# Patient Record
Sex: FEMALE | Race: WHITE | NOT HISPANIC OR LATINO | Employment: FULL TIME | URBAN - METROPOLITAN AREA
[De-identification: names, ages, dates, MRNs, and addresses within clinical notes are randomized per-mention and may not be internally consistent; named-entity substitution may affect disease eponyms.]

---

## 2017-08-14 ENCOUNTER — PROCEDURE VISIT (OUTPATIENT)
Dept: INTERNAL MEDICINE | Facility: CLINIC | Age: 27
End: 2017-08-14

## 2017-08-14 VITALS
SYSTOLIC BLOOD PRESSURE: 130 MMHG | HEART RATE: 91 BPM | BODY MASS INDEX: 47.72 KG/M2 | DIASTOLIC BLOOD PRESSURE: 72 MMHG | OXYGEN SATURATION: 100 % | WEIGHT: 278 LBS

## 2017-08-14 DIAGNOSIS — R87.619 ABNORMAL CERVICAL PAPANICOLAOU SMEAR, UNSPECIFIED ABNORMAL PAP FINDING: ICD-10-CM

## 2017-08-14 DIAGNOSIS — M25.562 LEFT KNEE PAIN, UNSPECIFIED CHRONICITY: Primary | ICD-10-CM

## 2017-08-14 PROCEDURE — 99213 OFFICE O/P EST LOW 20 MIN: CPT | Performed by: INTERNAL MEDICINE

## 2017-08-14 RX ORDER — IBUPROFEN 600 MG/1
600 TABLET ORAL EVERY 6 HOURS PRN
Qty: 30 TABLET | Refills: 2 | Status: SHIPPED | OUTPATIENT
Start: 2017-08-14 | End: 2018-09-28

## 2017-08-14 NOTE — PROGRESS NOTES
Gynecologic Exam and Knee Pain  and c/o left knee pain    Subjective   Zi Laboy is a 26 y.o. female is here today for follow-up.    History of Present Illness   wAS PLAYING SLIP AND SLIDE, and fell, and hurt her left knee.July 1st ( appx 6 weeks ago).  Denies swelling, having some persistent pain.  Here for a annual pap, s/p colposcopy and bx last year after her abnormal pap.        Current Outpatient Prescriptions:   •  ibuprofen (ADVIL,MOTRIN) 600 MG tablet, Take 1 tablet by mouth Every 6 (Six) Hours As Needed for Mild Pain (1-3)., Disp: 30 tablet, Rfl: 2      The following portions of the patient's history were reviewed and updated as appropriate: allergies, current medications, past family history, past medical history, past social history, past surgical history and problem list.    Review of Systems   Constitutional: Negative.  Negative for chills and fever.   HENT: Negative for ear discharge, ear pain, sinus pressure and sore throat.    Respiratory: Negative for cough, chest tightness and shortness of breath.    Cardiovascular: Negative for chest pain, palpitations and leg swelling.   Gastrointestinal: Negative for diarrhea, nausea and vomiting.   Musculoskeletal: Positive for arthralgias (knee pain) and joint swelling. Negative for back pain and myalgias.   Neurological: Negative for dizziness, syncope and headaches.   Psychiatric/Behavioral: Negative for confusion and sleep disturbance.       Objective   /72  Pulse 91  Wt 278 lb (126 kg)  SpO2 100%  BMI 47.72 kg/m2       Physical Exam   Constitutional: She is oriented to person, place, and time. She appears well-developed and well-nourished.   HENT:   Head: Normocephalic and atraumatic.   Right Ear: External ear normal.   Left Ear: External ear normal.   Mouth/Throat: No oropharyngeal exudate.   Eyes: Conjunctivae are normal. Pupils are equal, round, and reactive to light.   Neck: Neck supple.   Cardiovascular: Normal rate and regular rhythm.     Pulmonary/Chest: Effort normal and breath sounds normal.   Abdominal: Soft. Bowel sounds are normal.   Genitourinary: Uterus normal. Vaginal discharge: minimal normal appearing discharge.   Genitourinary Comments: Pap sent   Musculoskeletal: She exhibits no edema or tenderness (mildly decreased rom left knee).   Neurological: She is alert and oriented to person, place, and time. No cranial nerve deficit.   Skin: Skin is warm and dry.   Psychiatric: She has a normal mood and affect. Judgment normal.   Nursing note and vitals reviewed.        Results for orders placed or performed during the hospital encounter of 09/10/16   STAT Influenza antigen   Result Value Ref Range    Influenza A Ag, EIA Negative Negative    Influenza B Ag, EIA Negative Negative   Once Comprehensive metabolic panel   Result Value Ref Range    Glucose 91 70 - 100 mg/dL    BUN 9 9 - 23 mg/dL    Creatinine 0.70 0.60 - 1.30 mg/dL    Sodium 137 132 - 146 mmol/L    Potassium 4.0 3.5 - 5.5 mmol/L    Chloride 104 99 - 109 mmol/L    CO2 26.0 20.0 - 31.0 mmol/L    Calcium 9.2 8.7 - 10.4 mg/dL    Total Protein 7.6 5.7 - 8.2 g/dL    Albumin 4.20 3.20 - 4.80 g/dL    ALT (SGPT) 25 7 - 40 U/L    AST (SGOT) 27 0 - 33 U/L    Alkaline Phosphatase 69 25 - 100 U/L    Total Bilirubin 0.5 0.3 - 1.2 mg/dL    eGFR Non African Amer 101 >60 mL/min/1.73    Globulin 3.4 gm/dL    A/G Ratio 1.2 g/dL    BUN/Creatinine Ratio 12.9 7.0 - 25.0    Anion Gap 7.0 3.0 - 11.0 mmol/L   Once Lactic acid, plasma   Result Value Ref Range    Lactate 1.1 0.5 - 2.0 mmol/L   Once CBC auto differential   Result Value Ref Range    WBC 8.15 3.50 - 10.80 10*3/mm3    RBC 4.14 3.89 - 5.14 10*6/mm3    Hemoglobin 12.8 11.5 - 15.5 g/dL    Hematocrit 38.9 34.5 - 44.0 %    MCV 94.0 80.0 - 99.0 fL    MCH 30.9 27.0 - 31.0 pg    MCHC 32.9 32.0 - 36.0 g/dL    RDW 14.8 (H) 11.3 - 14.5 %    RDW-SD 50.8 37.0 - 54.0 fl    MPV 10.6 6.0 - 12.0 fL    Platelets 220 150 - 450 10*3/mm3    Neutrophil % 79.1 (H) 41.0  - 71.0 %    Lymphocyte % 15.5 (L) 24.0 - 44.0 %    Monocyte % 3.3 0.0 - 12.0 %    Eosinophil % 1.8 0.0 - 3.0 %    Basophil % 0.1 0.0 - 1.0 %    Immature Grans % 0.2 0.0 - 0.6 %    Neutrophils, Absolute 6.44 1.50 - 8.30 10*3/mm3    Lymphocytes, Absolute 1.26 0.60 - 4.80 10*3/mm3    Monocytes, Absolute 0.27 0.00 - 1.00 10*3/mm3    Eosinophils, Absolute 0.15 0.10 - 0.30 10*3/mm3    Basophils, Absolute 0.01 0.00 - 0.20 10*3/mm3    Immature Grans, Absolute 0.02 0.00 - 0.03 10*3/mm3   Once Light Blue Top   Result Value Ref Range    Extra Tube hold for add-on    Once Green Top (Gel)   Result Value Ref Range    Extra Tube Hold for add-ons.    Once Lavender Top   Result Value Ref Range    Extra Tube hold for add-on    Once Gold Top - SST   Result Value Ref Range    Extra Tube Hold for add-ons.              Assessment/Plan   Diagnoses and all orders for this visit:    Left knee pain, unspecified chronicity  -     ibuprofen (ADVIL,MOTRIN) 600 MG tablet; Take 1 tablet by mouth Every 6 (Six) Hours As Needed for Mild Pain (1-3).    Abnormal cervical Papanicolaou smear, unspecified abnormal pap finding      Pap smear sent await results.     Hand out for knee exercises given, adv. To do the stretches regularly for 4-6 weeks and cinb.  Continue current regimen      Return in about 1 year (around 8/14/2018) for Annual physical.

## 2017-09-13 ENCOUNTER — OFFICE VISIT (OUTPATIENT)
Dept: OBSTETRICS AND GYNECOLOGY | Facility: CLINIC | Age: 27
End: 2017-09-13

## 2017-09-13 VITALS
HEIGHT: 64 IN | WEIGHT: 275 LBS | SYSTOLIC BLOOD PRESSURE: 128 MMHG | DIASTOLIC BLOOD PRESSURE: 80 MMHG | BODY MASS INDEX: 46.95 KG/M2

## 2017-09-13 DIAGNOSIS — R87.610 ATYPICAL SQUAMOUS CELLS OF UNDETERMINED SIGNIFICANCE ON CYTOLOGIC SMEAR OF CERVIX (ASC-US): Primary | ICD-10-CM

## 2017-09-13 PROCEDURE — 57455 BIOPSY OF CERVIX W/SCOPE: CPT | Performed by: OBSTETRICS & GYNECOLOGY

## 2017-09-13 RX ORDER — ALBUTEROL SULFATE 90 UG/1
2 AEROSOL, METERED RESPIRATORY (INHALATION) EVERY 4 HOURS PRN
COMMUNITY
End: 2019-08-11

## 2017-09-13 NOTE — PROGRESS NOTES
"Date of precedure: 9/13/2017  Patient has a history of ASCUS on a Pap test in September 2015 with colposcopy which confirmed those findings.  She was instructed to return in 6 months however she did not do that.  She had a Pap test in Dr. Melgar's office on 8/14/2017 read as ASCUS, positive for HPV non-16/18.  She is here today for colposcopy.  She has had colposcopy before and is aware of the risks of pain, bleeding, and infection.   /80  Ht 64\" (162.6 cm)  Wt 275 lb (125 kg)  LMP 08/15/2017 (Exact Date)  BMI 47.2 kg/m2    No OB History data found    Risks and benefits discussed? yes  All questions answered? yes  Consents given by the patient  Written consent obtained? yes    Local anesthesia used:  no    Pre-op indication: ASCUS with POSITIVE high risk HPV  Procedure documentation:    The cervix was initially viewed colposcopically through a green filter.  The cervix was next bathed in acetic acid.   The findings were as follows:      The transformation zone was able to be seen adequately.    Findings  Acetowhite noted at 5 o'clock and 7 o'clock  Mosaicism noted at 1 o'clock, 2 o'clock, 3 o'clock, 4 o'clock and 12 o'clock Physical Exam: Lungs- Clear to auscultation, C-V- RRR with no murmur, rub or gallop, Abd.- obese, soft, non-tender with no organomegaly           Ectocervical biopsies taken from 2 o'clock, 5 o'clock and 7 o'clock.  Monsels solution was applied to the biopsy sites..  An ECC was not performed.      Colposcopic Impression: 1. Adequate colposcopy  2. Colposcopic findings are consistent with PAP (ASCUS with HPV, non-16/18 positive.       Plan: Will base further treatment on pathology results. Colposcopy with biopsies of cervix done.    *Please note that portions of this documentation may have been completed with a voice recognition program.  Efforts were made to edit this dictation, but occasional words may have been mistranscribed.     This note was electronically signed.    GERMÁN Pascual" MD [place]  September 13, 2017  9:39 AM

## 2017-09-18 ENCOUNTER — TELEPHONE (OUTPATIENT)
Dept: OBSTETRICS AND GYNECOLOGY | Facility: CLINIC | Age: 27
End: 2017-09-18

## 2017-09-18 RX ORDER — FLUCONAZOLE 150 MG/1
150 TABLET ORAL EVERY OTHER DAY
Qty: 3 TABLET | Refills: 0 | Status: SHIPPED | OUTPATIENT
Start: 2017-09-18 | End: 2018-03-14

## 2017-09-18 RX ORDER — DOXYCYCLINE HYCLATE 100 MG/1
100 TABLET, DELAYED RELEASE ORAL 2 TIMES DAILY
Qty: 14 TABLET | Refills: 0 | Status: SHIPPED | OUTPATIENT
Start: 2017-09-18 | End: 2017-09-25

## 2017-09-18 NOTE — TELEPHONE ENCOUNTER
Cervical biopsy report returns showing acute and chronic cervicitis and HPV.  Per Dr. Magallon, will treat with doxycycline 100 mg BID X 7 days.  Will also phone in diflucan 150 mg #3 to take qod while on doxycycline.  She has an appointment in 6 months for repeat pap.

## 2017-09-29 DIAGNOSIS — N91.2 AMENORRHEA: ICD-10-CM

## 2017-09-29 RX ORDER — NORGESTIMATE AND ETHINYL ESTRADIOL 7DAYSX3 28
KIT ORAL
Qty: 28 TABLET | Refills: 6 | Status: SHIPPED | OUTPATIENT
Start: 2017-09-29 | End: 2018-03-14

## 2018-03-14 ENCOUNTER — OFFICE VISIT (OUTPATIENT)
Dept: OBSTETRICS AND GYNECOLOGY | Facility: CLINIC | Age: 28
End: 2018-03-14

## 2018-03-14 VITALS
HEIGHT: 64 IN | DIASTOLIC BLOOD PRESSURE: 76 MMHG | SYSTOLIC BLOOD PRESSURE: 110 MMHG | WEIGHT: 270.4 LBS | BODY MASS INDEX: 46.16 KG/M2

## 2018-03-14 DIAGNOSIS — B37.31 VAGINAL CANDIDOSIS: ICD-10-CM

## 2018-03-14 DIAGNOSIS — R87.610 ATYPICAL SQUAMOUS CELLS OF UNDETERMINED SIGNIFICANCE ON CYTOLOGIC SMEAR OF CERVIX (ASC-US): Primary | ICD-10-CM

## 2018-03-14 PROCEDURE — 99213 OFFICE O/P EST LOW 20 MIN: CPT | Performed by: OBSTETRICS & GYNECOLOGY

## 2018-03-14 RX ORDER — FLUCONAZOLE 150 MG/1
150 TABLET ORAL EVERY OTHER DAY
Qty: 3 TABLET | Refills: 0 | Status: SHIPPED | OUTPATIENT
Start: 2018-03-14 | End: 2018-03-19

## 2018-03-14 NOTE — PROGRESS NOTES
Chief Complaint   Patient presents with   • Follow-up     repeat pap       Zi Laboy is a 27 y.o. year old  presenting to be seen for a PAP in follow-up of a prior abnormal PAP and/or HPV screen. This patient was referred by Nancy Melgar M.D., in  after having a Pap test on 2017 read as ASCUS, HPV, non--16/18 positive.  She underwent colposcopy in our office on 2017 with cervical biopsies read as atypia with HPV effect.  She returns today for a follow-up Pap test.  She has cyclic menses without intermenstrual bleeding.  She denies bowel or urinary symptoms.    Her last PAP: was done on approximately 2017 and the result was: ASCUS with POSITIVE high risk HPV (non-16/18).  Her last HPV test: was done on approximately 2017 and the result was:  HPV non/16/18 (+)  Uses tobacco currently: no  Sexually active: yes  Current contraception: none    No Additional Complaints Reported    The following portions of the patient's history were reviewed and updated as appropriate:vital signs and   She  does not have any pertinent problems on file.  She  has a past surgical history that includes Tonsillectomy (2008).  Her family history includes Diabetes in her other; Heart attack in her other; Hypertension in her mother and other.  She  reports that she has been smoking Cigarettes.  She has a 2.50 pack-year smoking history. She has never used smokeless tobacco. She reports that she drinks alcohol. She reports that she does not use drugs.  Current Outpatient Prescriptions   Medication Sig Dispense Refill   • albuterol (PROVENTIL HFA;VENTOLIN HFA) 108 (90 Base) MCG/ACT inhaler Inhale 2 puffs Every 4 (Four) Hours As Needed for Wheezing.     • ibuprofen (ADVIL,MOTRIN) 600 MG tablet Take 1 tablet by mouth Every 6 (Six) Hours As Needed for Mild Pain (1-3). 30 tablet 2   • fluconazole (DIFLUCAN) 150 MG tablet Take 1 tablet by mouth Every Other Day for 3 doses. 1 Every other day 3 tablet 0     No  "current facility-administered medications for this visit.      She has No Known Allergies..    Review of Systems  A comprehensive review of systems was negative.  Constitutional: negative for fever, chills, activity change, appetite change, fatigue and unexpected weight change.  Respiratory: negative  Cardiovascular: negative  Gastrointestinal: negative  Genitourinary:negative  Musculoskeletal:negative  Behavioral/Psych: negative        /76   Ht 162.6 cm (64\")   Wt 123 kg (270 lb 6.4 oz)   LMP 02/28/2018 (Exact Date)   BMI 46.41 kg/m²     Physical Exam    General:  alert; cooperative; well developed; well nourished  obese - Body mass index is 46.41 kg/m².   Thyroid: normal to inspection and palpation   Abdomen: soft, non-tender; no masses  no umbilical or inginual hernias are present  no hepato-splenomegaly   Pelvis: Clinical staff was present for exam  External genitalia:  normal appearance of the external genitalia including Bartholin's and [place]'s glands.  Vaginal:  normal pink mucosa without prolapse or lesions.  Cervix:  normal appearance.  Uterus:  normal size, shape and consistency. anteverted;  Adnexa:  normal bimanual exam of the adnexa.  Rectal:  anus visually normal appearing. recto-vaginal exam unremarkable and confirms findings;     Lab Review   No data reviewed beyond the Pap test    Imaging   No data reviewed       ASSESSMENT  Problems Addressed this Visit        Other    Abnormal Pap smear of cervix - Primary    Relevant Orders    Liquid-based Pap Smear, Screening      Other Visit Diagnoses     Vaginal candidosis        Relevant Medications    fluconazole (DIFLUCAN) 150 MG tablet              PLAN:   Pap test done  Fluconazole, 150 mg QOD for 3 doses  Follow up: 6 month(s)   *Please note that portions of this documentation may have been completed with a voice recognition program.  Efforts were made to edit this dictation, but occasional words may have been mistranscribed.  This note was " electronically signed.    GERMÁN Magallon MD  March 14, 2018  10:25 AM

## 2018-09-05 ENCOUNTER — OFFICE VISIT (OUTPATIENT)
Dept: OBSTETRICS AND GYNECOLOGY | Facility: CLINIC | Age: 28
End: 2018-09-05

## 2018-09-05 VITALS
SYSTOLIC BLOOD PRESSURE: 110 MMHG | DIASTOLIC BLOOD PRESSURE: 66 MMHG | WEIGHT: 272.6 LBS | BODY MASS INDEX: 46.54 KG/M2 | HEIGHT: 64 IN

## 2018-09-05 DIAGNOSIS — R87.610 ASCUS WITH POSITIVE HIGH RISK HPV CERVICAL: Primary | ICD-10-CM

## 2018-09-05 DIAGNOSIS — Z01.419 ENCOUNTER FOR GYNECOLOGICAL EXAMINATION WITHOUT ABNORMAL FINDING: ICD-10-CM

## 2018-09-05 DIAGNOSIS — R87.810 ASCUS WITH POSITIVE HIGH RISK HPV CERVICAL: Primary | ICD-10-CM

## 2018-09-05 PROBLEM — R87.619 ABNORMAL PAP SMEAR OF CERVIX: Status: RESOLVED | Noted: 2017-08-14 | Resolved: 2018-09-05

## 2018-09-05 PROCEDURE — 99395 PREV VISIT EST AGE 18-39: CPT | Performed by: OBSTETRICS & GYNECOLOGY

## 2018-09-05 NOTE — PROGRESS NOTES
Chief Complaint   Patient presents with   • Gynecologic Exam       Zi Laboy is a 28 y.o. year old  presenting to be seen for her annual exam.  This patient has a history of ASCUS, HPV-non-16/18 positivity on her Pap test from 3/14/2018.  Colposcopy with directed biopsies in March revealed chronic cervicitis with HPV effect.  She returns today for follow-up Pap test.  She denies symptoms of vaginal infection.  She denies bowel or urinary symptoms.  She has cyclic menses without intermenstrual bleeding.  She is not sexually active at present.    SCREENING TESTS    Year 2012   Age                         PAP       ASCUS                  HPV high risk       Non-16/18                  Mammogram                         MYRANDA score                         Breast MRI                         Lipids                         Vitamin D                         Colonoscopy                         DEXA  Frax (hip/any)                         Ovarian Screen                             She exercises regularly: no.  She wears her seat belt: yes.  She has concerns about domestic violence: no.  She has noticed changes in height: no    GYN screening history:  · Last pap: was done on approximately 3/14/2018 and the result was: ASCUS with POSITIVE high risk HPV (Non-16/18)..    No Additional Complaints Reported    The following portions of the patient's history were reviewed and updated as appropriate:vital signs and   She  has a past medical history of Abnormal Pap smear of cervix; Gastritis; and Vaginitis.  She  does not have any pertinent problems on file.  She  has a past surgical history that includes Tonsillectomy (2008).  Her family history includes Diabetes in her other; Heart attack in her other; Hypertension in her mother and other.  She  reports that she has been smoking Cigarettes.  She has a 2.50 pack-year  "smoking history. She has never used smokeless tobacco. She reports that she drinks alcohol. She reports that she does not use drugs.  Current Outpatient Prescriptions   Medication Sig Dispense Refill   • albuterol (PROVENTIL HFA;VENTOLIN HFA) 108 (90 Base) MCG/ACT inhaler Inhale 2 puffs Every 4 (Four) Hours As Needed for Wheezing.     • ibuprofen (ADVIL,MOTRIN) 600 MG tablet Take 1 tablet by mouth Every 6 (Six) Hours As Needed for Mild Pain (1-3). 30 tablet 2     No current facility-administered medications for this visit.      She has No Known Allergies..    Review of Systems  A comprehensive review of systems was taken.  Constitutional: negative for fever, chills, activity change, appetite change, fatigue and unexpected weight change.  Respiratory: negative  Cardiovascular: negative  Gastrointestinal: negative  Genitourinary:negative  Musculoskeletal:negative  Behavioral/Psych: negative       /66   Ht 162.6 cm (64\")   Wt 124 kg (272 lb 9.6 oz)   LMP 08/10/2018 (Exact Date)   BMI 46.79 kg/m²     Physical Exam    General:  alert; cooperative; well developed; well nourished  obese - Body mass index is 46.79 kg/m².   Skin:  No suspicious lesions seen   Thyroid: normal to inspection and palpation   Lungs:  clear to auscultation bilaterally   Heart:  regular rate and rhythm, S1, S2 normal, no murmur, click, rub or gallop   Breasts:  Examined in supine position  Symmetric without masses or skin dimpling  Nipples normal without inversion, lesions or discharge  There are no palpable axillary nodes   Abdomen: soft, non-tender; no masses  no umbilical or inginual hernias are present  no hepato-splenomegaly  obese   Pelvis: Clinical staff was present for exam  External genitalia:  normal appearance of the external genitalia including Bartholin's and [place]'s glands.  Vaginal:  normal pink mucosa without prolapse or lesions.  Cervix:  normal appearance.  Uterus:  normal size, shape and consistency. " anteverted;  Adnexa:  normal bimanual exam of the adnexa.     Lab Review   No data reviewed    Imaging  No data reviewed         ASSESSMENT  Problems Addressed this Visit        Other    ASCUS with positive high risk HPV cervical - Primary    Relevant Orders    Liquid-based Pap Smear, Screening      Other Visit Diagnoses     Encounter for gynecological examination without abnormal finding              PLAN    · Medications prescribed this encounter:  No orders of the defined types were placed in this encounter.  · Pap test done  · I have strongly encouraged the patient to stop smoking  · Low carbohydrate diet and increased exercise  · Follow up: 12 month(s)  *Please note that portions of this documentation may have been completed with a voice recognition program.  Efforts were made to edit this dictation, but occasional words may have been mistranscribed.       This note was electronically signed.    GERMÁN Magallon MD  September 5, 2018  10:08 AM

## 2018-09-28 ENCOUNTER — HOSPITAL ENCOUNTER (EMERGENCY)
Facility: HOSPITAL | Age: 28
Discharge: HOME | End: 2018-09-28
Attending: EMERGENCY MEDICINE | Admitting: EMERGENCY MEDICINE

## 2018-09-28 ENCOUNTER — APPOINTMENT (OUTPATIENT)
Dept: GENERAL RADIOLOGY | Facility: HOSPITAL | Age: 28
End: 2018-09-28

## 2018-09-28 VITALS
BODY MASS INDEX: 47.84 KG/M2 | TEMPERATURE: 98.3 F | WEIGHT: 270 LBS | HEART RATE: 80 BPM | HEIGHT: 63 IN | OXYGEN SATURATION: 98 % | SYSTOLIC BLOOD PRESSURE: 128 MMHG | DIASTOLIC BLOOD PRESSURE: 75 MMHG | RESPIRATION RATE: 18 BRPM

## 2018-09-28 DIAGNOSIS — S30.0XXA LUMBAR CONTUSION, INITIAL ENCOUNTER: ICD-10-CM

## 2018-09-28 DIAGNOSIS — S20.20XA CONTUSION OF THORACIC WALL, INITIAL ENCOUNTER: ICD-10-CM
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
## 2018-09-28 PROCEDURE — 72072 X-RAY EXAM THORAC SPINE 3VWS: CPT

## 2018-09-28 PROCEDURE — 72100 X-RAY EXAM L-S SPINE 2/3 VWS: CPT

## 2018-09-28 PROCEDURE — 99282 EMERGENCY DEPT VISIT SF MDM: CPT

## 2018-09-28 RX ORDER — NAPROXEN 500 MG/1
500 TABLET ORAL 2 TIMES DAILY WITH MEALS
Qty: 10 TABLET | Refills: 0 | Status: SHIPPED | OUTPATIENT
Start: 2018-09-28 | End: 2018-10-03

## 2018-10-03 ENCOUNTER — OFFICE VISIT (OUTPATIENT)
Dept: INTERNAL MEDICINE | Facility: CLINIC | Age: 28
End: 2018-10-03

## 2018-10-03 VITALS
RESPIRATION RATE: 20 BRPM | BODY MASS INDEX: 49.11 KG/M2 | DIASTOLIC BLOOD PRESSURE: 66 MMHG | SYSTOLIC BLOOD PRESSURE: 118 MMHG | HEART RATE: 89 BPM | WEIGHT: 277.25 LBS | TEMPERATURE: 98.3 F | OXYGEN SATURATION: 98 %
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
## 2018-10-03 DIAGNOSIS — M54.50 ACUTE MIDLINE LOW BACK PAIN WITHOUT SCIATICA: ICD-10-CM

## 2018-10-03 PROCEDURE — 99213 OFFICE O/P EST LOW 20 MIN: CPT | Performed by: INTERNAL MEDICINE

## 2018-10-03 RX ORDER — CYCLOBENZAPRINE HCL 10 MG
10 TABLET ORAL 3 TIMES DAILY PRN
Qty: 30 TABLET | Refills: 2 | OUTPATIENT
Start: 2018-10-03 | End: 2019-08-11

## 2018-10-03 RX ORDER — NAPROXEN 500 MG/1
500 TABLET ORAL 2 TIMES DAILY WITH MEALS
Qty: 30 TABLET | Refills: 2 | OUTPATIENT
Start: 2018-10-03 | End: 2019-08-11
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
## 2018-10-12 ENCOUNTER — TELEPHONE (OUTPATIENT)
Dept: INTERNAL MEDICINE | Facility: CLINIC | Age: 28
End: 2018-10-12

## 2018-10-12 NOTE — TELEPHONE ENCOUNTER
Patient called and stated that she is following up and stated since Chiropractor her pain has been decreasing.

## 2018-10-23 ENCOUNTER — OFFICE VISIT (OUTPATIENT)
Dept: INTERNAL MEDICINE | Facility: CLINIC | Age: 28
End: 2018-10-23

## 2018-10-23 VITALS
WEIGHT: 277 LBS | SYSTOLIC BLOOD PRESSURE: 102 MMHG | BODY MASS INDEX: 49.07 KG/M2 | HEART RATE: 95 BPM | DIASTOLIC BLOOD PRESSURE: 70 MMHG | OXYGEN SATURATION: 99 %

## 2018-10-23 DIAGNOSIS — M54.9 UPPER BACK PAIN: Primary | ICD-10-CM

## 2018-10-23 PROCEDURE — 99212 OFFICE O/P EST SF 10 MIN: CPT | Performed by: INTERNAL MEDICINE
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
## 2019-08-11 ENCOUNTER — APPOINTMENT (OUTPATIENT)
Dept: GENERAL RADIOLOGY | Facility: HOSPITAL | Age: 29
End: 2019-08-11

## 2019-08-11 ENCOUNTER — HOSPITAL ENCOUNTER (EMERGENCY)
Facility: HOSPITAL | Age: 29
Discharge: HOME | End: 2019-08-11
Attending: EMERGENCY MEDICINE | Admitting: EMERGENCY MEDICINE

## 2019-08-11 VITALS
SYSTOLIC BLOOD PRESSURE: 120 MMHG | BODY MASS INDEX: 47.84 KG/M2 | TEMPERATURE: 99.2 F | DIASTOLIC BLOOD PRESSURE: 72 MMHG | WEIGHT: 270 LBS | RESPIRATION RATE: 18 BRPM | HEIGHT: 63 IN | HEART RATE: 106 BPM | OXYGEN SATURATION: 98 %
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
## 2019-08-11 DIAGNOSIS — S39.012A LUMBOSACRAL STRAIN, INITIAL ENCOUNTER: ICD-10-CM

## 2019-08-11 DIAGNOSIS — E66.01 MORBID OBESITY WITH BMI OF 45.0-49.9, ADULT (HCC): ICD-10-CM

## 2019-08-11 LAB
B-HCG UR QL: NEGATIVE
INTERNAL NEGATIVE CONTROL: NEGATIVE
INTERNAL POSITIVE CONTROL: POSITIVE
Lab: NORMAL

## 2019-08-11 PROCEDURE — 72100 X-RAY EXAM L-S SPINE 2/3 VWS: CPT

## 2019-08-11 PROCEDURE — 99283 EMERGENCY DEPT VISIT LOW MDM: CPT

## 2019-08-11 PROCEDURE — 81025 URINE PREGNANCY TEST: CPT | Performed by: EMERGENCY MEDICINE

## 2019-08-11 RX ORDER — ACETAMINOPHEN 500 MG
1000 TABLET ORAL ONCE
Status: COMPLETED | OUTPATIENT
Start: 2019-08-11 | End: 2019-08-11

## 2019-08-11 RX ORDER — METAXALONE 800 MG/1
800 TABLET ORAL ONCE
Status: COMPLETED | OUTPATIENT
Start: 2019-08-11 | End: 2019-08-11

## 2019-08-11 RX ORDER — NAPROXEN 250 MG/1
500 TABLET ORAL ONCE
Status: COMPLETED | OUTPATIENT
Start: 2019-08-11 | End: 2019-08-11

## 2019-08-11 RX ORDER — NAPROXEN 500 MG/1
500 TABLET ORAL 2 TIMES DAILY WITH MEALS
Qty: 14 TABLET | Refills: 0 | Status: SHIPPED | OUTPATIENT
Start: 2019-08-11

## 2019-08-11 RX ORDER — METAXALONE 800 MG/1
800 TABLET ORAL 3 TIMES DAILY PRN
Qty: 15 TABLET | Refills: 0 | Status: SHIPPED | OUTPATIENT
Start: 2019-08-11

## 2019-08-11 RX ADMIN — NAPROXEN 500 MG: 250 TABLET ORAL at 14:37

## 2019-08-11 RX ADMIN — METAXALONE 800 MG: 800 TABLET ORAL at 14:37

## 2019-08-11 RX ADMIN — ACETAMINOPHEN 1000 MG: 500 TABLET, FILM COATED ORAL at 14:37
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
## 2019-08-14 ENCOUNTER — TELEPHONE (OUTPATIENT)
Dept: INTERNAL MEDICINE | Facility: CLINIC | Age: 29
End: 2019-08-14
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
## 2019-08-16 ENCOUNTER — OFFICE VISIT (OUTPATIENT)
Dept: INTERNAL MEDICINE | Facility: CLINIC | Age: 29
End: 2019-08-16

## 2019-08-16 VITALS
WEIGHT: 282.8 LBS | HEART RATE: 70 BPM | BODY MASS INDEX: 50.11 KG/M2 | DIASTOLIC BLOOD PRESSURE: 70 MMHG | SYSTOLIC BLOOD PRESSURE: 100 MMHG | OXYGEN SATURATION: 100 % | HEIGHT: 63 IN

## 2019-08-16 DIAGNOSIS — M54.50 ACUTE RIGHT-SIDED LOW BACK PAIN WITHOUT SCIATICA: Primary | ICD-10-CM
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
## 2019-08-16 DIAGNOSIS — F43.9 SITUATIONAL STRESS: ICD-10-CM

## 2019-08-16 PROCEDURE — 99213 OFFICE O/P EST LOW 20 MIN: CPT | Performed by: INTERNAL MEDICINE

## 2019-08-16 RX ORDER — FLUOXETINE HYDROCHLORIDE 20 MG/1
20 CAPSULE ORAL DAILY
Qty: 30 CAPSULE | Refills: 5 | Status: SHIPPED | OUTPATIENT
Start: 2019-08-16 | End: 2022-08-29 | Stop reason: SDUPTHER
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
## 2019-08-27 ENCOUNTER — OFFICE VISIT (OUTPATIENT)
Dept: INTERNAL MEDICINE | Facility: CLINIC | Age: 29
End: 2019-08-27

## 2019-08-27 VITALS — WEIGHT: 277 LBS | OXYGEN SATURATION: 97 % | HEIGHT: 63 IN | BODY MASS INDEX: 49.08 KG/M2 | TEMPERATURE: 98.6 F

## 2019-08-27 DIAGNOSIS — R55 SYNCOPE, UNSPECIFIED SYNCOPE TYPE: Primary | ICD-10-CM

## 2019-08-27 DIAGNOSIS — R30.0 DYSURIA: ICD-10-CM

## 2019-08-27 DIAGNOSIS — N30.01 ACUTE CYSTITIS WITH HEMATURIA: ICD-10-CM

## 2019-08-27 LAB
BILIRUB BLD-MCNC: NEGATIVE MG/DL
CLARITY, POC: ABNORMAL
COLOR UR: YELLOW
GLUCOSE UR STRIP-MCNC: NEGATIVE MG/DL
KETONES UR QL: NEGATIVE
LEUKOCYTE EST, POC: ABNORMAL
NITRITE UR-MCNC: NEGATIVE MG/ML
PH UR: 6.5 [PH] (ref 5–8)
PROT UR STRIP-MCNC: NEGATIVE MG/DL
RBC # UR STRIP: ABNORMAL /UL
SP GR UR: 1.01 (ref 1–1.03)
UROBILINOGEN UR QL: ABNORMAL

## 2019-08-27 PROCEDURE — 99213 OFFICE O/P EST LOW 20 MIN: CPT | Performed by: NURSE PRACTITIONER

## 2019-08-27 RX ORDER — NITROFURANTOIN 25; 75 MG/1; MG/1
100 CAPSULE ORAL EVERY 12 HOURS SCHEDULED
Qty: 10 CAPSULE | Refills: 0 | Status: SHIPPED | OUTPATIENT
Start: 2019-08-27 | End: 2019-09-01

## 2019-08-27 NOTE — PROGRESS NOTES
Chief Complaint   Patient presents with   • Rapid Heart Rate   • Loss of Consciousness       History of Present Illness    Zi Laboy is a 29 y.o. female who presents today for syncopal episode that occurred early this morning. Patient reports feeling faint and heart racing upon waking to urinate this morning. States she sat on the side of the bed before proceeding to stand. Upon entering bathroom she fainted and fell to the floor. She reports having lost consciousness for unknown amount of time before being found by mother and brother. She does not think she hit her head. [place] weak when she came to and has improved entirely at this time aside from body aches from trauma of fall. She was evaluated at  ED and discharged from hospital approximately 35-40 minutes ago, with negative EKG and lab results. Was unable to urinate there. Upon discharge from ED was told syncopal episode likely from orthostatic hypotension. She drinks lots of water and does not feel dehydration could be cause. She has been watching dietary intake for weight loss efforts but reports eating at least 2 meals/day. She denies dysuria, hematuria, back pain, abdominal/pelvic pain, fevers, chills, diaphoresis, N/V/D. Denies history of cardia or neurological events. I have read and reviewed the reports and lab work from [facility] as obtained through the  portal.     [facility]    The following portions of the patient's history were reviewed and updated as appropriate: allergies, current medications, past family history, past medical history, past social history, past surgical history and problem list.     Social History     Tobacco Use   • Smoking status: Current Every Day Smoker     Packs/day: 0.50     Years: 5.00     Pack years: 2.50     Types: Cigarettes   • Smokeless tobacco: Never Used   Substance Use Topics   • Alcohol use: Yes     Comment: on occasion       Past Medical History:   Diagnosis Date   • Abnormal Pap smear of cervix     Repeat  pap sent, reassured Pt. that very likely she will clear her non 16/18 HPV spontaneously.   • Gastritis    • Low back pain    • Vaginitis        Past Surgical History:   Procedure Laterality Date   • TONSILLECTOMY  12/2008       No Known Allergies      Current Outpatient Medications:   •  FLUoxetine (PROZAC) 20 MG capsule, Take 1 capsule by mouth Daily., Disp: 30 capsule, Rfl: 5  •  metaxalone (SKELAXIN) 800 MG tablet, Take 1 tablet by mouth 3 (Three) Times a Day As Needed for Muscle Spasms., Disp: 15 tablet, Rfl: 0  •  naproxen (NAPROSYN) 500 MG tablet, Take 1 tablet by mouth 2 (Two) Times a Day With Meals., Disp: 14 tablet, Rfl: 0  •  nitrofurantoin, macrocrystal-monohydrate, (MACROBID) 100 MG capsule, Take 1 capsule by mouth Every 12 (Twelve) Hours for 5 days., Disp: 10 capsule, Rfl: 0    Review of Systems  Review of Systems   Constitutional: Negative for activity change, appetite change, chills, diaphoresis, fatigue and fever.   Eyes: Negative for visual disturbance.   Respiratory: Negative for cough, chest tightness and shortness of breath.    Cardiovascular: Negative for chest pain and palpitations.   Gastrointestinal: Negative for abdominal pain, constipation, diarrhea, nausea and vomiting.   Endocrine: Negative for cold intolerance, heat intolerance, polydipsia, polyphagia and polyuria.   Genitourinary: Positive for frequency (since childhood). Negative for difficulty urinating, dysuria, flank pain, hematuria, pelvic pain and urgency.   Musculoskeletal: Positive for myalgias. Negative for arthralgias, back pain, gait problem, joint swelling, neck pain and neck stiffness.   Skin: Negative for color change, rash and wound.   Neurological: Positive for syncope. Negative for dizziness, tremors, seizures, facial asymmetry, speech difficulty, weakness, light-headedness, numbness and headaches.   Psychiatric/Behavioral: Negative for confusion, decreased concentration, dysphoric mood and sleep disturbance. The  "patient is not nervous/anxious.        Vitals:  Vitals:    08/27/19 1413   Temp: 98.6 °F (37 °C)   TempSrc: Oral   SpO2: 97%   Weight: 126 kg (277 lb)   Height: 160 cm (62.99\")       Physical Exam  Physical Exam   Constitutional: She is oriented to person, place, and time. Vital signs are normal. She appears well-developed and well-nourished.   HENT:   Head: Normocephalic and atraumatic.   Eyes: Conjunctivae, EOM and lids are normal. Pupils are equal, round, and reactive to light.   Fundoscopic exam:       The right eye shows red reflex.        The left eye shows red reflex.   Neck: Normal range of motion. Neck supple. No JVD present. No tracheal tenderness present. Carotid bruit is not present.   Cardiovascular: Normal rate, regular rhythm and normal heart sounds.   Pulses:       Radial pulses are 2+ on the right side, and 2+ on the left side.        Dorsalis pedis pulses are 2+ on the right side, and 2+ on the left side.   No edema   Pulmonary/Chest: Effort normal and breath sounds normal. She has no decreased breath sounds. She has no wheezes.   Abdominal: Soft. Normal appearance and bowel sounds are normal. There is no tenderness. There is no CVA tenderness.   Neurological: She is alert and oriented to person, place, and time. She has normal strength. No sensory deficit.   Skin: Skin is warm and dry.   Psychiatric: She has a normal mood and affect. Her behavior is normal.   Nursing note and vitals reviewed.      Labs  Results for orders placed or performed in visit on 08/27/19   POCT urinalysis dipstick, automated   Result Value Ref Range    Color Yellow Yellow, Straw, Dark Yellow, Kelsey    Clarity, UA Cloudy (A) Clear    Specific Gravity  1.015 1.005 - 1.030    pH, Urine 6.5 5.0 - 8.0    Leukocytes 500 Fernando/ul (A) Negative    Nitrite, UA Negative Negative    Protein, POC Negative Negative mg/dL    Glucose, UA Negative Negative, 1000 mg/dL (3+) mg/dL    Ketones, UA Negative Negative    Urobilinogen, UA 1 E.U./dL  " (A) Normal    Bilirubin Negative Negative    Blood, UA 50 Ishan/ul (A) Negative       Assessment/Plan  Zi was seen today for rapid heart rate and loss of consciousness.    Diagnoses and all orders for this visit:    Syncope, unspecified syncope type    Dysuria  -     POCT urinalysis dipstick, automated    Acute cystitis with hematuria  -     Urine Culture - Urine, Urine, Clean Catch  -     nitrofurantoin, macrocrystal-monohydrate, (MACROBID) 100 MG capsule; Take 1 capsule by mouth Every 12 (Twelve) Hours for 5 days.    Syncopal episode possibly due to acute UTI given UA results and absence of pre-exisitng symptoms and unremarkable PE. Will notify of urine culture results received and treat accordingly.  Patient started on antibiotics empirically given presenting symptoms and lab results in office.  Patient advised and adequate water intake, avoidance of excessive caffeine, and use of OTC Azo and Tylenol/ibuprofen as needed.  Advised patient to follow-up for new, worsening, or persistent symptoms for repeat urinalysis.    Plan of care reviewed with patient at conclusion of today's visit. Patient education was provided regarding diagnosis, management, and prescribed or recommended OTC medications. Patient was informed to notify office of any new, worsening, or persistent symptoms. Patient verbalized understanding and agreement with plan of care.     Follow-Up  Return if symptoms worsen or fail to improve.    Electronically Signed By:  JARED Dorsey

## 2019-09-12 ENCOUNTER — OFFICE VISIT (OUTPATIENT)
Dept: OBSTETRICS AND GYNECOLOGY | Facility: CLINIC | Age: 29
End: 2019-09-12

## 2019-09-12 VITALS
SYSTOLIC BLOOD PRESSURE: 118 MMHG | DIASTOLIC BLOOD PRESSURE: 72 MMHG | HEIGHT: 63 IN | BODY MASS INDEX: 49.29 KG/M2 | WEIGHT: 278.2 LBS

## 2019-09-12 DIAGNOSIS — R87.610 ASCUS WITH POSITIVE HIGH RISK HPV CERVICAL: Primary | ICD-10-CM

## 2019-09-12 DIAGNOSIS — R87.810 ASCUS WITH POSITIVE HIGH RISK HPV CERVICAL: Primary | ICD-10-CM

## 2019-09-12 DIAGNOSIS — Z01.419 ENCOUNTER FOR GYNECOLOGICAL EXAMINATION WITHOUT ABNORMAL FINDING: ICD-10-CM

## 2019-09-12 PROCEDURE — 99395 PREV VISIT EST AGE 18-39: CPT | Performed by: OBSTETRICS & GYNECOLOGY

## 2019-09-12 NOTE — PROGRESS NOTES
Chief Complaint   Patient presents with   • Gynecologic Exam       Zi Laboy is a 29 y.o. year old  presenting to be seen for her annual exam.  This patient has cyclic menses without intermenstrual bleeding.  She has a prior history of ASCUS on Pap test with HPV, non--16/18 positivity.  She has had no cervical procedures.  She denies bowel or urinary symptoms.  She does not have significant dysmenorrhea.    SCREENING TESTS    Year 2012   Age                         PAP       ASCUS                  HPV high risk       Non-16/18                  Mammogram                         MYRANDA score                         Breast MRI                         Lipids                         Vitamin D                         Colonoscopy                         DEXA  Frax (hip/any)                         Ovarian Screen                             She exercises regularly: no.  She wears her seat belt: yes.  She has concerns about domestic violence: no.  She has noticed changes in height: no    GYN screening history:  · Last pap: was done on approximately 2018 and the result was: ASCUS with POSITIVE high risk HPV (non-16/18)..    No Additional Complaints Reported    The following portions of the patient's history were reviewed and updated as appropriate:vital signs and   She  has a past medical history of Abnormal Pap smear of cervix, Gastritis, Low back pain, and Vaginitis.  She does not have any pertinent problems on file.  She  has a past surgical history that includes Tonsillectomy (2008).  Her family history includes Diabetes in her other; Heart attack in her other; Hypertension in her mother and other.  She  reports that she has been smoking cigarettes.  She has a 2.50 pack-year smoking history. She has never used smokeless tobacco. She reports that she drinks alcohol. She reports that she does not use  "drugs.  Current Outpatient Medications   Medication Sig Dispense Refill   • FLUoxetine (PROZAC) 20 MG capsule Take 1 capsule by mouth Daily. 30 capsule 5   • metaxalone (SKELAXIN) 800 MG tablet Take 1 tablet by mouth 3 (Three) Times a Day As Needed for Muscle Spasms. 15 tablet 0   • naproxen (NAPROSYN) 500 MG tablet Take 1 tablet by mouth 2 (Two) Times a Day With Meals. 14 tablet 0     No current facility-administered medications for this visit.      She has No Known Allergies..    Review of Systems  A comprehensive review of systems was taken.  Constitutional: negative for fever, chills, activity change, appetite change, fatigue and unexpected weight change.  Respiratory: negative  Cardiovascular: negative  Gastrointestinal: negative  Genitourinary:negative  Musculoskeletal:negative  Behavioral/Psych: negative       /72   Ht 160 cm (63\")   Wt 126 kg (278 lb 3.2 oz)   LMP 08/21/2019 (Approximate)   Breastfeeding? No   BMI 49.28 kg/m²     Physical Exam    General:  alert; cooperative; well developed; well nourished  obese - Body mass index is 49.28 kg/m².   Skin:  No suspicious lesions seen   Thyroid: normal to inspection and palpation   Lungs:  clear to auscultation bilaterally   Heart:  regular rate and rhythm, S1, S2 normal, no murmur, click, rub or gallop   Breasts:  Examined in supine position  Symmetric without masses or skin dimpling  Nipples normal without inversion, lesions or discharge  There are no palpable axillary nodes   Abdomen: soft, non-tender; no masses  no umbilical or inguinal hernias are present  no hepato-splenomegaly   Pelvis: Clinical staff was present for exam  External genitalia:  normal appearance of the external genitalia including Bartholin's and [place]'s glands.  Vaginal:  normal pink mucosa without prolapse or lesions.  Cervix:  normal appearance.  Uterus:  normal size, shape and consistency. anteverted;  Adnexa:  normal bimanual exam of the adnexa.  Rectal:  anus visually " normal appearing. recto-vaginal exam unremarkable and confirms findings;     Lab Review   Pap results- ASCUS, HPV, non-16/18+    Imaging  No data reviewed         ASSESSMENT  Problems Addressed this Visit        Other    ASCUS with positive high risk HPV cervical - Primary    Relevant Orders    Liquid-based Pap Smear, Screening      Other Visit Diagnoses     Encounter for gynecological examination without abnormal finding                  Substance History:   reports that she has been smoking cigarettes.  She has a 2.50 pack-year smoking history. She has never used smokeless tobacco.   reports that she drinks alcohol.   reports that she does not use drugs.    Substance use counseling was provided during this visit related to history of positive tobacco use.  I have strongly advised the patient to stop smoking cigarettes for her health benefit and also because she is HPV positive.  She states that she is smoking 1 pack a day of cigarettes.      PLAN    · Medications prescribed this encounter:  No orders of the defined types were placed in this encounter.  · Smoking cessation information  · Pap test done  · Monthly self breast assessment  · Regular weight-bearing exercise, and weight reduction diet  · Follow up: 12 month(s)  *Please note that portions of this documentation may have been completed with a voice recognition program.  Efforts were made to edit this dictation, but occasional words may have been mistranscribed.       This note was electronically signed.    GERMÁN Magallon MD  September 12, 2019  3:55 PM

## 2019-09-12 NOTE — PATIENT INSTRUCTIONS

## 2019-09-24 ENCOUNTER — TELEPHONE (OUTPATIENT)
Dept: OBSTETRICS AND GYNECOLOGY | Facility: CLINIC | Age: 29
End: 2019-09-24

## 2019-09-24 NOTE — TELEPHONE ENCOUNTER
Patient was calling for pap test results.  Her recent pap showed ASCUS and high risk HPV non 16/18 positive, but is stable.  Per Dr. Magallon repeat in one year.  Patient has an appointment 9/22/2020 for annual exam and pap.  She voiced understanding of results.

## 2019-12-18 ENCOUNTER — OFFICE VISIT (OUTPATIENT)
Dept: INTERNAL MEDICINE | Facility: CLINIC | Age: 29
End: 2019-12-18

## 2019-12-18 VITALS
BODY MASS INDEX: 48.9 KG/M2 | WEIGHT: 276 LBS | HEIGHT: 63 IN | SYSTOLIC BLOOD PRESSURE: 110 MMHG | DIASTOLIC BLOOD PRESSURE: 62 MMHG | HEART RATE: 74 BPM | OXYGEN SATURATION: 100 %

## 2019-12-18 DIAGNOSIS — N92.6 IRREGULAR PERIODS: Primary | ICD-10-CM

## 2019-12-18 PROCEDURE — 82728 ASSAY OF FERRITIN: CPT | Performed by: INTERNAL MEDICINE

## 2019-12-18 PROCEDURE — 80053 COMPREHEN METABOLIC PANEL: CPT | Performed by: INTERNAL MEDICINE

## 2019-12-18 PROCEDURE — 82670 ASSAY OF TOTAL ESTRADIOL: CPT | Performed by: INTERNAL MEDICINE

## 2019-12-18 PROCEDURE — 99213 OFFICE O/P EST LOW 20 MIN: CPT | Performed by: INTERNAL MEDICINE

## 2019-12-18 PROCEDURE — 84144 ASSAY OF PROGESTERONE: CPT | Performed by: INTERNAL MEDICINE

## 2019-12-18 PROCEDURE — 83540 ASSAY OF IRON: CPT | Performed by: INTERNAL MEDICINE

## 2019-12-18 PROCEDURE — 84466 ASSAY OF TRANSFERRIN: CPT | Performed by: INTERNAL MEDICINE

## 2019-12-18 PROCEDURE — 83001 ASSAY OF GONADOTROPIN (FSH): CPT | Performed by: INTERNAL MEDICINE

## 2019-12-18 PROCEDURE — 84439 ASSAY OF FREE THYROXINE: CPT | Performed by: INTERNAL MEDICINE

## 2019-12-18 PROCEDURE — 85027 COMPLETE CBC AUTOMATED: CPT | Performed by: INTERNAL MEDICINE

## 2019-12-18 PROCEDURE — 87591 N.GONORRHOEAE DNA AMP PROB: CPT | Performed by: INTERNAL MEDICINE

## 2019-12-18 PROCEDURE — 81025 URINE PREGNANCY TEST: CPT | Performed by: INTERNAL MEDICINE

## 2019-12-18 PROCEDURE — 87491 CHLMYD TRACH DNA AMP PROBE: CPT | Performed by: INTERNAL MEDICINE

## 2019-12-18 PROCEDURE — 84443 ASSAY THYROID STIM HORMONE: CPT | Performed by: INTERNAL MEDICINE

## 2019-12-18 RX ORDER — ETONOGESTREL AND ETHINYL ESTRADIOL 11.7; 2.7 MG/1; MG/1
INSERT, EXTENDED RELEASE VAGINAL
Qty: 3 EACH | Refills: 3 | Status: SHIPPED | OUTPATIENT
Start: 2019-12-18 | End: 2022-08-29

## 2019-12-18 NOTE — PROGRESS NOTES
"Menstrual Problem (had a regular cycle last month, then later on hte 9th had a day of spotting)    Subjective   Zi Laboy is a 29 y.o. female is here today for follow-up.    History of Present Illness   Period was very light, the day before thanksgiving.  Ended on the 1st, but didn't have any symptoms.  Then had it again on the 9th- spotting.and had thicker discharge- breasts are sore.    Current Outpatient Medications:   •  FLUoxetine (PROZAC) 20 MG capsule, Take 1 capsule by mouth Daily., Disp: 30 capsule, Rfl: 5  •  metaxalone (SKELAXIN) 800 MG tablet, Take 1 tablet by mouth 3 (Three) Times a Day As Needed for Muscle Spasms., Disp: 15 tablet, Rfl: 0  •  naproxen (NAPROSYN) 500 MG tablet, Take 1 tablet by mouth 2 (Two) Times a Day With Meals., Disp: 14 tablet, Rfl: 0  •  etonogestrel-ethinyl estradiol (NUVARING) 0.12-0.015 MG/24HR vaginal ring, Insert vaginally and leave in place for 3 consecutive weeks, then remove for 1 week., Disp: 3 each, Rfl: 3      The following portions of the patient's history were reviewed and updated as appropriate: allergies, current medications, past family history, past medical history, past social history, past surgical history and problem list.    Review of Systems   Constitutional: Negative.  Negative for chills and fever.   HENT: Negative for ear discharge, ear pain, sinus pressure and sore throat.    Respiratory: Negative for cough, chest tightness and shortness of breath.         Breast soreness   Cardiovascular: Negative for chest pain, palpitations and leg swelling.   Gastrointestinal: Negative for diarrhea, nausea and vomiting.   Musculoskeletal: Negative for arthralgias, back pain and myalgias.   Neurological: Negative for dizziness, syncope and headaches.   Psychiatric/Behavioral: Negative for confusion and sleep disturbance.       Objective   /62   Pulse 74   Ht 160 cm (63\")   Wt 125 kg (276 lb)   LMP 11/27/2019 (Exact Date)   SpO2 100% Comment: ra  BMI " 48.89 kg/m²   Physical Exam   Constitutional: She is oriented to person, place, and time. She appears well-developed and well-nourished.   HENT:   Head: Normocephalic and atraumatic.   Mouth/Throat: No oropharyngeal exudate.   Eyes: Pupils are equal, round, and reactive to light. Conjunctivae are normal.   Neck: Neck supple. No thyromegaly present.   Cardiovascular: Normal rate and regular rhythm.   Pulmonary/Chest: Effort normal and breath sounds normal.   Abdominal: Soft. Bowel sounds are normal. She exhibits no distension. There is no tenderness.   Musculoskeletal: She exhibits no edema.   Neurological: She is alert and oriented to person, place, and time. No cranial nerve deficit.   Skin: Skin is warm and dry.   Psychiatric: She has a normal mood and affect. Judgment normal.   Nursing note and vitals reviewed.        Results for orders placed or performed in visit on 12/18/19   Chlamydia trachomatis, Neisseria gonorrhoeae, PCR - Urine, Urine, Clean Catch   Result Value Ref Range    Chlamydia trachomatis, ARABELLA Negative Negative    Neisseria gonorrhoeae, ARABELLA Negative Negative   Ferritin   Result Value Ref Range    Ferritin 46.20 13.00 - 150.00 ng/mL   Iron Profile   Result Value Ref Range    Iron 142 37 - 145 mcg/dL    Iron Saturation 38 20 - 50 %    Transferrin 254 200 - 360 mg/dL    TIBC 378 298 - 536 mcg/dL   CBC (No Diff)   Result Value Ref Range    WBC 6.51 3.40 - 10.80 10*3/mm3    RBC 3.89 3.77 - 5.28 10*6/mm3    Hemoglobin 12.6 12.0 - 15.9 g/dL    Hematocrit 37.7 34.0 - 46.6 %    MCV 96.9 79.0 - 97.0 fL    MCH 32.4 26.6 - 33.0 pg    MCHC 33.4 31.5 - 35.7 g/dL    RDW 12.9 12.3 - 15.4 %    RDW-SD 45.1 37.0 - 54.0 fl    MPV 11.8 6.0 - 12.0 fL    Platelets 213 140 - 450 10*3/mm3   Comprehensive Metabolic Panel   Result Value Ref Range    Glucose 71 65 - 99 mg/dL    BUN 7 6 - 20 mg/dL    Creatinine 0.64 0.57 - 1.00 mg/dL    Sodium 138 136 - 145 mmol/L    Potassium 4.0 3.5 - 5.2 mmol/L    Chloride 101 98 - 107  mmol/L    CO2 26.7 22.0 - 29.0 mmol/L    Calcium 9.4 8.6 - 10.5 mg/dL    Total Protein 7.1 6.0 - 8.5 g/dL    Albumin 3.90 3.50 - 5.20 g/dL    ALT (SGPT) 14 1 - 33 U/L    AST (SGOT) 18 1 - 32 U/L    Alkaline Phosphatase 79 39 - 117 U/L    Total Bilirubin 0.4 0.2 - 1.2 mg/dL    eGFR Non African Amer 110 >60 mL/min/1.73    Globulin 3.2 gm/dL    A/G Ratio 1.2 g/dL    BUN/Creatinine Ratio 10.9 7.0 - 25.0    Anion Gap 10.3 5.0 - 15.0 mmol/L   TSH   Result Value Ref Range    TSH 2.840 0.270 - 4.200 uIU/mL   T4, Free   Result Value Ref Range    Free T4 0.98 0.93 - 1.70 ng/dL   Estradiol   Result Value Ref Range    Estradiol 118.0 pg/mL   Progesterone   Result Value Ref Range    Progesterone 1.86 ng/mL   Follicle Stimulating Hormone   Result Value Ref Range    FSH 5.26 mIU/mL   POCT pregnancy, urine   Result Value Ref Range    HCG, Urine, QL Negative Negative    Lot Number [id number]     Internal Positive Control Positive     Internal Negative Control Negative              Assessment/Plan   Diagnoses and all orders for this visit:    Irregular periods  -     POCT pregnancy, urine  -     Ferritin  -     Iron Profile  -     CBC (No Diff)  -     Comprehensive Metabolic Panel  -     TSH  -     etonogestrel-ethinyl estradiol (NUVARING) 0.12-0.015 MG/24HR vaginal ring; Insert vaginally and leave in place for 3 consecutive weeks, then remove for 1 week.  -     T4, Free  -     Estradiol  -     Progesterone  -     Follicle Stimulating Hormone  -     Chlamydia trachomatis, Neisseria gonorrhoeae, PCR - Urine, Urine, Clean Catch                 Return if symptoms worsen or fail to improve.

## 2019-12-19 LAB
ALBUMIN SERPL-MCNC: 3.9 G/DL (ref 3.5–5.2)
ALBUMIN/GLOB SERPL: 1.2 G/DL
ALP SERPL-CCNC: 79 U/L (ref 39–117)
ALT SERPL W P-5'-P-CCNC: 14 U/L (ref 1–33)
ANION GAP SERPL CALCULATED.3IONS-SCNC: 10.3 MMOL/L (ref 5–15)
AST SERPL-CCNC: 18 U/L (ref 1–32)
BILIRUB SERPL-MCNC: 0.4 MG/DL (ref 0.2–1.2)
BUN BLD-MCNC: 7 MG/DL (ref 6–20)
BUN/CREAT SERPL: 10.9 (ref 7–25)
CALCIUM SPEC-SCNC: 9.4 MG/DL (ref 8.6–10.5)
CHLORIDE SERPL-SCNC: 101 MMOL/L (ref 98–107)
CO2 SERPL-SCNC: 26.7 MMOL/L (ref 22–29)
CREAT BLD-MCNC: 0.64 MG/DL (ref 0.57–1)
DEPRECATED RDW RBC AUTO: 45.1 FL (ref 37–54)
ERYTHROCYTE [DISTWIDTH] IN BLOOD BY AUTOMATED COUNT: 12.9 % (ref 12.3–15.4)
ESTRADIOL SERPL HS-MCNC: 118 PG/ML
FERRITIN SERPL-MCNC: 46.2 NG/ML (ref 13–150)
FSH SERPL-ACNC: 5.26 MIU/ML
GFR SERPL CREATININE-BSD FRML MDRD: 110 ML/MIN/1.73
GLOBULIN UR ELPH-MCNC: 3.2 GM/DL
GLUCOSE BLD-MCNC: 71 MG/DL (ref 65–99)
HCT VFR BLD AUTO: 37.7 % (ref 34–46.6)
HGB BLD-MCNC: 12.6 G/DL (ref 12–15.9)
IRON 24H UR-MRATE: 142 MCG/DL (ref 37–145)
IRON SATN MFR SERPL: 38 % (ref 20–50)
MCH RBC QN AUTO: 32.4 PG (ref 26.6–33)
MCHC RBC AUTO-ENTMCNC: 33.4 G/DL (ref 31.5–35.7)
MCV RBC AUTO: 96.9 FL (ref 79–97)
PLATELET # BLD AUTO: 213 10*3/MM3 (ref 140–450)
PMV BLD AUTO: 11.8 FL (ref 6–12)
POTASSIUM BLD-SCNC: 4 MMOL/L (ref 3.5–5.2)
PROGEST SERPL-MCNC: 1.86 NG/ML
PROT SERPL-MCNC: 7.1 G/DL (ref 6–8.5)
RBC # BLD AUTO: 3.89 10*6/MM3 (ref 3.77–5.28)
SODIUM BLD-SCNC: 138 MMOL/L (ref 136–145)
T4 FREE SERPL-MCNC: 0.98 NG/DL (ref 0.93–1.7)
TIBC SERPL-MCNC: 378 MCG/DL (ref 298–536)
TRANSFERRIN SERPL-MCNC: 254 MG/DL (ref 200–360)
TSH SERPL DL<=0.05 MIU/L-ACNC: 2.84 UIU/ML (ref 0.27–4.2)
WBC NRBC COR # BLD: 6.51 10*3/MM3 (ref 3.4–10.8)

## 2019-12-21 LAB
C TRACH RRNA SPEC DONR QL NAA+PROBE: NEGATIVE
N GONORRHOEA DNA SPEC QL NAA+PROBE: NEGATIVE

## 2020-01-29 ENCOUNTER — APPOINTMENT (OUTPATIENT)
Dept: GENERAL RADIOLOGY | Facility: HOSPITAL | Age: 30
End: 2020-01-29

## 2020-01-29 ENCOUNTER — HOSPITAL ENCOUNTER (EMERGENCY)
Facility: HOSPITAL | Age: 30
Discharge: HOME | End: 2020-01-29
Attending: EMERGENCY MEDICINE | Admitting: EMERGENCY MEDICINE

## 2020-01-29 VITALS
HEIGHT: 64 IN | TEMPERATURE: 98.5 F | OXYGEN SATURATION: 96 % | HEART RATE: 86 BPM | SYSTOLIC BLOOD PRESSURE: 123 MMHG | RESPIRATION RATE: 18 BRPM | BODY MASS INDEX: 46.95 KG/M2 | DIASTOLIC BLOOD PRESSURE: 63 MMHG | WEIGHT: 275 LBS

## 2020-01-29 DIAGNOSIS — S90.31XA CONTUSION OF RIGHT FOOT, INITIAL ENCOUNTER: ICD-10-CM

## 2020-01-29 DIAGNOSIS — S40.021A ARM CONTUSION, RIGHT, INITIAL ENCOUNTER: Primary | ICD-10-CM

## 2020-01-29 DIAGNOSIS — S90.121A CONTUSION OF FIFTH TOE OF RIGHT FOOT, INITIAL ENCOUNTER: ICD-10-CM

## 2020-01-29 PROCEDURE — 99283 EMERGENCY DEPT VISIT LOW MDM: CPT

## 2020-01-29 PROCEDURE — 73060 X-RAY EXAM OF HUMERUS: CPT

## 2020-01-29 PROCEDURE — 73502 X-RAY EXAM HIP UNI 2-3 VIEWS: CPT

## 2020-01-29 PROCEDURE — 73630 X-RAY EXAM OF FOOT: CPT

## 2020-01-29 RX ORDER — NAPROXEN 500 MG/1
500 TABLET ORAL 2 TIMES DAILY PRN
Qty: 10 TABLET | Refills: 0 | Status: SHIPPED | OUTPATIENT
Start: 2020-01-29 | End: 2020-02-03

## 2022-08-29 ENCOUNTER — OFFICE VISIT (OUTPATIENT)
Dept: INTERNAL MEDICINE | Facility: CLINIC | Age: 32
End: 2022-08-29

## 2022-08-29 ENCOUNTER — LAB (OUTPATIENT)
Dept: LAB | Facility: HOSPITAL | Age: 32
End: 2022-08-29

## 2022-08-29 VITALS
HEART RATE: 91 BPM | BODY MASS INDEX: 50.02 KG/M2 | HEIGHT: 64 IN | OXYGEN SATURATION: 100 % | WEIGHT: 293 LBS | SYSTOLIC BLOOD PRESSURE: 124 MMHG | TEMPERATURE: 98.8 F | DIASTOLIC BLOOD PRESSURE: 78 MMHG

## 2022-08-29 DIAGNOSIS — E66.01 MORBID (SEVERE) OBESITY DUE TO EXCESS CALORIES: ICD-10-CM

## 2022-08-29 DIAGNOSIS — Z20.2 EXPOSURE TO STD: ICD-10-CM

## 2022-08-29 DIAGNOSIS — E55.9 VITAMIN D DEFICIENCY: ICD-10-CM

## 2022-08-29 DIAGNOSIS — F43.9 SITUATIONAL STRESS: ICD-10-CM

## 2022-08-29 DIAGNOSIS — Z00.00 ROUTINE GENERAL MEDICAL EXAMINATION AT A HEALTH CARE FACILITY: Primary | ICD-10-CM

## 2022-08-29 DIAGNOSIS — Z12.4 SCREENING FOR CERVICAL CANCER: ICD-10-CM

## 2022-08-29 DIAGNOSIS — Z00.00 ROUTINE GENERAL MEDICAL EXAMINATION AT A HEALTH CARE FACILITY: ICD-10-CM

## 2022-08-29 LAB
BILIRUB BLD-MCNC: NEGATIVE MG/DL
CLARITY, POC: ABNORMAL
COLOR UR: ABNORMAL
DEPRECATED RDW RBC AUTO: 48.9 FL (ref 37–54)
ERYTHROCYTE [DISTWIDTH] IN BLOOD BY AUTOMATED COUNT: 13.4 % (ref 12.3–15.4)
EXPIRATION DATE: ABNORMAL
GLUCOSE UR STRIP-MCNC: NEGATIVE MG/DL
HBA1C MFR BLD: 5.2 % (ref 4.8–5.6)
HBV SURFACE AG SERPL QL IA: NORMAL
HCT VFR BLD AUTO: 39.3 % (ref 34–46.6)
HGB BLD-MCNC: 12.6 G/DL (ref 12–15.9)
KETONES UR QL: NEGATIVE
LEUKOCYTE EST, POC: NEGATIVE
Lab: ABNORMAL
MCH RBC QN AUTO: 31.8 PG (ref 26.6–33)
MCHC RBC AUTO-ENTMCNC: 32.1 G/DL (ref 31.5–35.7)
MCV RBC AUTO: 99.2 FL (ref 79–97)
NITRITE UR-MCNC: NEGATIVE MG/ML
PH UR: 6 [PH] (ref 5–8)
PLATELET # BLD AUTO: 175 10*3/MM3 (ref 140–450)
PMV BLD AUTO: 12 FL (ref 6–12)
PROT UR STRIP-MCNC: NEGATIVE MG/DL
RBC # BLD AUTO: 3.96 10*6/MM3 (ref 3.77–5.28)
RBC # UR STRIP: NEGATIVE /UL
SP GR UR: 1.03 (ref 1–1.03)
UROBILINOGEN UR QL: NORMAL
WBC NRBC COR # BLD: 6.51 10*3/MM3 (ref 3.4–10.8)

## 2022-08-29 PROCEDURE — 80053 COMPREHEN METABOLIC PANEL: CPT

## 2022-08-29 PROCEDURE — 36415 COLL VENOUS BLD VENIPUNCTURE: CPT

## 2022-08-29 PROCEDURE — 82306 VITAMIN D 25 HYDROXY: CPT

## 2022-08-29 PROCEDURE — 80061 LIPID PANEL: CPT

## 2022-08-29 PROCEDURE — 82607 VITAMIN B-12: CPT

## 2022-08-29 PROCEDURE — 81003 URINALYSIS AUTO W/O SCOPE: CPT | Performed by: INTERNAL MEDICINE

## 2022-08-29 PROCEDURE — G0432 EIA HIV-1/HIV-2 SCREEN: HCPCS

## 2022-08-29 PROCEDURE — 99395 PREV VISIT EST AGE 18-39: CPT | Performed by: INTERNAL MEDICINE

## 2022-08-29 PROCEDURE — 84443 ASSAY THYROID STIM HORMONE: CPT

## 2022-08-29 PROCEDURE — 87491 CHLMYD TRACH DNA AMP PROBE: CPT | Performed by: INTERNAL MEDICINE

## 2022-08-29 PROCEDURE — 87340 HEPATITIS B SURFACE AG IA: CPT

## 2022-08-29 PROCEDURE — 86803 HEPATITIS C AB TEST: CPT

## 2022-08-29 PROCEDURE — 85027 COMPLETE CBC AUTOMATED: CPT

## 2022-08-29 PROCEDURE — 83036 HEMOGLOBIN GLYCOSYLATED A1C: CPT

## 2022-08-29 PROCEDURE — 87591 N.GONORRHOEAE DNA AMP PROB: CPT | Performed by: INTERNAL MEDICINE

## 2022-08-29 PROCEDURE — 84439 ASSAY OF FREE THYROXINE: CPT

## 2022-08-29 RX ORDER — BUPROPION HYDROCHLORIDE 150 MG/1
TABLET ORAL
Qty: 60 TABLET | Refills: 5 | Status: SHIPPED | OUTPATIENT
Start: 2022-08-29

## 2022-08-29 RX ORDER — FLUOXETINE HYDROCHLORIDE 20 MG/1
20 CAPSULE ORAL DAILY
Qty: 90 CAPSULE | Refills: 3 | Status: SHIPPED | OUTPATIENT
Start: 2022-08-29

## 2022-08-29 RX ORDER — PHENTERMINE HYDROCHLORIDE 37.5 MG/1
TABLET ORAL
Qty: 30 TABLET | Refills: 0 | Status: SHIPPED | OUTPATIENT
Start: 2022-08-29 | End: 2022-09-26 | Stop reason: SDUPTHER

## 2022-08-29 NOTE — PROGRESS NOTES
Chief Complaint   Patient presents with   • Annual Exam   • Gynecologic Exam       History of Present Illness  HM, Adult Female: The patient is being seen for a health maintenance and gynecology evaluation. The last health maintenance visit was 1 year(s) ago.   Social History: She is single. Work status: full time  She has smokes. She reports never drinking alcohol. Denies any illicit drug use.  General Health: The patient's health is described as good. She has regular dental visits. She denies vision problems. She denies hearing loss. Immunizations status: up to date.   Lifestyle:. She consumes a diverse and healthy diet. She does have any weight concerns. She exercises regularly. She denies tobacco. She denies alcohol use. She denies drug use.   Reproductive health:. She reports normal menses, occ missed period.   Screening: Cancer screening reviewed and current.   Grandmother - paternal and half brother with breast cancer, she was advised to have Mamm starting the age of 35.  Metabolic screening reviewed and current.   Risk screening reviewed and current.     HPI  Would like to try adipex for weight loss.  Not on OBC, periods occ irregular.      Review of Systems   Constitutional: Negative for chills and fatigue.   HENT: Negative for congestion, ear pain and sore throat.    Eyes: Negative for pain, redness and visual disturbance.   Respiratory: Negative for cough and shortness of breath.    Cardiovascular: Negative for chest pain, palpitations and leg swelling.   Gastrointestinal: Negative for abdominal pain, diarrhea and nausea.   Endocrine: Negative for cold intolerance and heat intolerance.   Genitourinary: Negative for flank pain and urgency.   Musculoskeletal: Negative for arthralgias and gait problem.   Skin: Negative for pallor and rash.   Neurological: Negative for dizziness, weakness and headaches.   Psychiatric/Behavioral: Negative for dysphoric mood and sleep disturbance. The patient is not  "nervous/anxious.        Patient Active Problem List   Diagnosis   • ASCUS with positive high risk HPV cervical       Social History     Socioeconomic History   • Marital status: Single   Tobacco Use   • Smoking status: Current Every Day Smoker     Packs/day: 0.50     Years: 5.00     Pack years: 2.50     Types: Cigarettes   • Smokeless tobacco: Never Used   Vaping Use   • Vaping Use: Never used   Substance and Sexual Activity   • Alcohol use: Yes     Comment: on occasion   • Drug use: Yes     Types: Marijuana   • Sexual activity: Yes     Partners: Male       Current Outpatient Medications on File Prior to Visit   Medication Sig Dispense Refill   • metaxalone (SKELAXIN) 800 MG tablet Take 1 tablet by mouth 3 (Three) Times a Day As Needed for Muscle Spasms. 15 tablet 0   • naproxen (NAPROSYN) 500 MG tablet Take 1 tablet by mouth 2 (Two) Times a Day With Meals. 14 tablet 0   • [DISCONTINUED] FLUoxetine (PROZAC) 20 MG capsule Take 1 capsule by mouth Daily. 30 capsule 5   • [DISCONTINUED] etonogestrel-ethinyl estradiol (NUVARING) 0.12-0.015 MG/24HR vaginal ring Insert vaginally and leave in place for 3 consecutive weeks, then remove for 1 week. 3 each 3     No current facility-administered medications on file prior to visit.       No Known Allergies    /78   Pulse 91   Temp 98.8 °F (37.1 °C)   Ht 161.3 cm (63.5\")   Wt 135 kg (297 lb 3.2 oz)   LMP 08/04/2022 (Exact Date)   SpO2 100% Comment: ra  BMI 51.82 kg/m²            The following portions of the patient's history were reviewed and updated as appropriate: allergies, current medications, past family history, past medical history, past social history, past surgical history and problem list.    Physical Exam  Constitutional:       General: She is not in acute distress.     Appearance: Normal appearance.   HENT:      Head: Normocephalic and atraumatic.      Right Ear: Tympanic membrane and external ear normal.      Left Ear: Tympanic membrane and external ear " normal.      Nose: Nose normal.      Mouth/Throat:      Mouth: Mucous membranes are moist.   Eyes:      General: No scleral icterus.  Neck:      Vascular: No carotid bruit.   Cardiovascular:      Rate and Rhythm: Normal rate and regular rhythm.      Pulses: Normal pulses.      Heart sounds: Normal heart sounds. No murmur heard.    No friction rub. No gallop.      Comments: Breast exam wnl  Pulmonary:      Effort: Pulmonary effort is normal.      Breath sounds: Normal breath sounds. No rhonchi or rales.   Abdominal:      General: Bowel sounds are normal. There is no distension.      Palpations: Abdomen is soft.      Tenderness: There is no right CVA tenderness, left CVA tenderness, guarding or rebound.      Hernia: No hernia is present. There is no hernia in the left inguinal area or right inguinal area.   Genitourinary:     General: Normal vulva.      Exam position: Lithotomy position.      Pubic Area: No rash.       Labia:         Right: No lesion.         Left: No lesion.       Urethra: No prolapse, urethral pain or urethral swelling.      Vagina: No vaginal discharge or tenderness.      Cervix: No cervical motion tenderness, discharge or cervical bleeding.      Uterus: Normal. Not enlarged and not tender.       Adnexa: Right adnexa normal and left adnexa normal.      Rectum: No tenderness, external hemorrhoid or internal hemorrhoid.   Musculoskeletal:         General: No tenderness. Normal range of motion.      Cervical back: Normal range of motion.      Right lower leg: No edema.      Left lower leg: No edema.   Lymphadenopathy:      Cervical: No cervical adenopathy.      Lower Body: No right inguinal adenopathy. No left inguinal adenopathy.   Skin:     General: Skin is warm.      Findings: No rash.   Neurological:      General: No focal deficit present.      Mental Status: She is alert and oriented to person, place, and time. Mental status is at baseline.      Cranial Nerves: No cranial nerve deficit.       Sensory: No sensory deficit.      Coordination: Coordination normal.      Gait: Gait normal.      Deep Tendon Reflexes: Reflexes normal.   Psychiatric:         Mood and Affect: Mood normal.         Behavior: Behavior normal.         Results for orders placed or performed in visit on 08/29/22   POCT urinalysis dipstick, automated    Specimen: Urine   Result Value Ref Range    Color Dark Yellow Yellow, Straw, Dark Yellow, Kelsey    Clarity, UA Hazy (A) Clear    Specific Gravity  1.030 1.005 - 1.030    pH, Urine 6.0 5.0 - 8.0    Leukocytes Negative Negative    Nitrite, UA Negative Negative    Protein, POC Negative Negative mg/dL    Glucose, UA Negative Negative mg/dL    Ketones, UA Negative Negative    Urobilinogen, UA Normal Normal, 0.2 E.U./dL    Bilirubin Negative Negative    Blood, UA Negative Negative    Lot Number [id number]     Expiration Date 12/17/23        Diagnoses and all orders for this visit:    1. Routine general medical examination at a health care facility (Primary)  -     POCT urinalysis dipstick, automated  -     FLUoxetine (PROzac) 20 MG capsule; Take 1 capsule by mouth Daily.  Dispense: 90 capsule; Refill: 3  -     CBC (No Diff); Future  -     Comprehensive Metabolic Panel; Future  -     Lipid Panel; Future  -     TSH; Future  -     T4, Free; Future  -     Vitamin B12; Future  -     Hemoglobin A1c; Future  -     HIV-1/O/2 Ag/Ab w Reflex; Future  -     Hepatitis B surface antigen; Future  -     Chlamydia trachomatis, Neisseria gonorrhoeae, PCR - Urine, Urine, Clean Catch; Future  -     Hepatitis C Antibody; Future  -     Chlamydia trachomatis, Neisseria gonorrhoeae, PCR - Urine, Urine, Clean Catch    2. Screening for cervical cancer  -     LIQUID-BASED PAP SMEAR, [facility] ([facility]); Future  -     LIQUID-BASED PAP SMEAR, [facility] ([facility])    3. Situational stress  -     FLUoxetine (PROzac) 20 MG capsule; Take 1 capsule by mouth Daily.  Dispense: 90 capsule; Refill: 3    4. Vitamin D  deficiency  -     Vitamin D 25 Hydroxy; Future    5. Exposure to STD  -     HIV-1/O/2 Ag/Ab w Reflex; Future  -     Hepatitis B surface antigen; Future  -     Chlamydia trachomatis, Neisseria gonorrhoeae, PCR - Urine, Urine, Clean Catch; Future  -     Chlamydia trachomatis, Neisseria gonorrhoeae, PCR - Urine, Urine, Clean Catch    6. Morbid (severe) obesity due to excess calories (HCC)  -     buPROPion XL (Wellbutrin XL) 150 MG 24 hr tablet; 1 po daily x 1 week then 2 PO daily  Dispense: 60 tablet; Refill: 5  -     phentermine (ADIPEX-P) 37.5 MG tablet; 1/2 po daily x 1 week then 1 PO daily  Dispense: 30 tablet; Refill: 0      Adv if pap abnormal or with insufficient cells, will refer to GYN.    Encouraged to quit smoking.  Start Wellbutrin as above.    The patient has read and signed the [facility] Controlled Substance Contract.  I will continue to see patient for regular follow up appointments.  They are well controlled on their medication.  KADEN has been reviewed by me and is updated every 3 months. The patient is aware of the potential for addiction and dependence.      Health Maintenance   Topic Date Due   • HEPATITIS C SCREENING  Never done   • COVID-19 Vaccine (1) 08/31/2022 (Originally 2/25/1991)   • Pneumococcal Vaccine 0-64 (1 - PCV) 08/29/2023 (Originally 8/25/1996)   • PAP SMEAR  09/12/2022   • INFLUENZA VACCINE  10/01/2022   • ANNUAL PHYSICAL  08/30/2023   • TDAP/TD VACCINES (2 - Td or Tdap) 01/27/2027       Discussion/Summary  Impression: health maintenance visit. Currently, she eats an adequate diet and has an adequate exercise regimen.   Cervical cancer screening:Pap smear is current.   Breast cancer screening: mammogram is due at 35   Colorectal cancer screening: colonoscopy is not indicated.  Osteoporosis screening: Bone mineral density test is not indicated.   CT low dose screen -will be due at 50.  Screening lab work includes hemoglobin, glucose, lipid profile, thyroid function testing,  25-hydroxyvitamin D and urinalysis.   Immunizations are needed, immunizations will be given as outlined in the orders   Advice and education were given regarding cardiovascular risk reduction, healthy dietary habits, Seatbelt and helmet use and self skin examination.     Return in about 4 weeks (around 9/26/2022) for Recheck please schedule at 10.30 am.      Electronically signed by:    Nancy Melgar MD

## 2022-08-30 LAB
25(OH)D3 SERPL-MCNC: 15.9 NG/ML (ref 30–100)
ALBUMIN SERPL-MCNC: 3.9 G/DL (ref 3.5–5.2)
ALBUMIN/GLOB SERPL: 1.7 G/DL
ALP SERPL-CCNC: 81 U/L (ref 39–117)
ALT SERPL W P-5'-P-CCNC: 12 U/L (ref 1–33)
ANION GAP SERPL CALCULATED.3IONS-SCNC: 8 MMOL/L (ref 5–15)
AST SERPL-CCNC: 18 U/L (ref 1–32)
BILIRUB SERPL-MCNC: 0.2 MG/DL (ref 0–1.2)
BUN SERPL-MCNC: 11 MG/DL (ref 6–20)
BUN/CREAT SERPL: 15.7 (ref 7–25)
C TRACH RRNA SPEC QL NAA+PROBE: NEGATIVE
CALCIUM SPEC-SCNC: 8.9 MG/DL (ref 8.6–10.5)
CHLORIDE SERPL-SCNC: 107 MMOL/L (ref 98–107)
CHOLEST SERPL-MCNC: 166 MG/DL (ref 0–200)
CO2 SERPL-SCNC: 21 MMOL/L (ref 22–29)
CREAT SERPL-MCNC: 0.7 MG/DL (ref 0.57–1)
EGFRCR SERPLBLD CKD-EPI 2021: 118 ML/MIN/1.73
GLOBULIN UR ELPH-MCNC: 2.3 GM/DL
GLUCOSE SERPL-MCNC: 78 MG/DL (ref 65–99)
HCV AB SER DONR QL: NORMAL
HDLC SERPL-MCNC: 40 MG/DL (ref 40–60)
HIV1+2 AB SER QL: NORMAL
LDLC SERPL CALC-MCNC: 93 MG/DL (ref 0–100)
LDLC/HDLC SERPL: 2.18 {RATIO}
N GONORRHOEA RRNA SPEC QL NAA+PROBE: NEGATIVE
POTASSIUM SERPL-SCNC: 4.4 MMOL/L (ref 3.5–5.2)
PROT SERPL-MCNC: 6.2 G/DL (ref 6–8.5)
SODIUM SERPL-SCNC: 136 MMOL/L (ref 136–145)
T4 FREE SERPL-MCNC: 0.79 NG/DL (ref 0.93–1.7)
TRIGL SERPL-MCNC: 195 MG/DL (ref 0–150)
TSH SERPL DL<=0.05 MIU/L-ACNC: 2.28 UIU/ML (ref 0.27–4.2)
VIT B12 BLD-MCNC: 336 PG/ML (ref 211–946)
VLDLC SERPL-MCNC: 33 MG/DL (ref 5–40)

## 2022-09-01 LAB — REF LAB TEST METHOD: NORMAL

## 2022-09-02 ENCOUNTER — TELEPHONE (OUTPATIENT)
Dept: INTERNAL MEDICINE | Facility: CLINIC | Age: 32
End: 2022-09-02

## 2022-09-02 NOTE — TELEPHONE ENCOUNTER
Pt needing a letter for her therapy dog for her apartment .  Pt states she had gotten one from her therapist, but who she see's now is unable to do letter.  Please advise.

## 2022-09-02 NOTE — TELEPHONE ENCOUNTER
PATIENT WAS CALLING IN TO ASK ANGI DIAZ A QUESTION.    SHE DID NOT WANT TO DISCLOSE ANY OTHER INFORMATION.    KASH ORTIZ  [phone]

## 2022-09-11 RX ORDER — ERGOCALCIFEROL 1.25 MG/1
50000 CAPSULE ORAL WEEKLY
Qty: 12 CAPSULE | Refills: 1 | Status: SHIPPED | OUTPATIENT
Start: 2022-09-11

## 2022-09-26 ENCOUNTER — OFFICE VISIT (OUTPATIENT)
Dept: INTERNAL MEDICINE | Facility: CLINIC | Age: 32
End: 2022-09-26

## 2022-09-26 VITALS
HEART RATE: 73 BPM | BODY MASS INDEX: 48.83 KG/M2 | HEIGHT: 64 IN | WEIGHT: 286 LBS | TEMPERATURE: 97.6 F | SYSTOLIC BLOOD PRESSURE: 128 MMHG | DIASTOLIC BLOOD PRESSURE: 82 MMHG | OXYGEN SATURATION: 99 %

## 2022-09-26 DIAGNOSIS — E66.01 MORBID (SEVERE) OBESITY DUE TO EXCESS CALORIES: ICD-10-CM

## 2022-09-26 PROCEDURE — 99213 OFFICE O/P EST LOW 20 MIN: CPT | Performed by: INTERNAL MEDICINE

## 2022-09-26 RX ORDER — LANOLIN ALCOHOL/MO/W.PET/CERES
1000 CREAM (GRAM) TOPICAL DAILY
COMMUNITY

## 2022-09-26 RX ORDER — PHENTERMINE HYDROCHLORIDE 37.5 MG/1
37.5 TABLET ORAL
Qty: 30 TABLET | Refills: 1 | Status: SHIPPED | OUTPATIENT
Start: 2022-09-26

## 2022-09-26 NOTE — PROGRESS NOTES
"Weight Check (Down 11 lbs, only taking 0.5 tablet)    Subjective   Zi Laboy is a 32 y.o. female is here today for follow-up.    History of Present Illness   Zi Laboy is a 32-year-old female who is present for a follow-up. After taking only half of the adipex pill, she has lost 11 pounds. She reports that B12 was not covered by insurance. However, she states that she does not want the injections, but she would get B12 from over-the-counter. She denies having chest pain, dizziness, and palpitations. She reports that she took one whole pill at 11:00PM and it took her a while before she could fall asleep. Therefore, she is advised to increase her B12 \"pills\" to a whole if she is losing less than 2 pounds in a week.     Called her mother for confirmation of her blood type which is AB.     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        Current Outpatient Medications:   •  buPROPion XL (Wellbutrin XL) 150 MG 24 hr tablet, 1 po daily x 1 week then 2 PO daily, Disp: 60 tablet, Rfl: 5  •  FLUoxetine (PROzac) 20 MG capsule, Take 1 capsule by mouth Daily., Disp: 90 capsule, Rfl: 3  •  metaxalone (SKELAXIN) 800 MG tablet, Take 1 tablet by mouth 3 (Three) Times a Day As Needed for Muscle Spasms., Disp: 15 tablet, Rfl: 0  •  naproxen (NAPROSYN) 500 MG tablet, Take 1 tablet by mouth 2 (Two) Times a Day With Meals., Disp: 14 tablet, Rfl: 0  •  phentermine (ADIPEX-P) 37.5 MG tablet, Take 1 tablet by mouth Every Morning Before Breakfast., Disp: 30 tablet, Rfl: 1  •  vitamin B-12 (CYANOCOBALAMIN) 1000 MCG tablet, Take 1,000 mcg by mouth Daily., Disp: , Rfl:   •  vitamin D (ERGOCALCIFEROL) 1.25 MG (08400 UT) capsule capsule, Take 1 capsule by mouth 1 (One) Time Per Week., Disp: 12 capsule, Rfl: 1      The following portions of the patient's history were reviewed and updated as appropriate: allergies, current medications, past family history, past medical history, past social " "history, past surgical history and problem list.    Review of Systems   Constitutional: Positive for unexpected weight change. Negative for chills and fever.   HENT: Negative for ear discharge, ear pain, sinus pressure and sore throat.    Respiratory: Negative for cough, chest tightness and shortness of breath.    Cardiovascular: Negative for chest pain, palpitations and leg swelling.   Gastrointestinal: Negative for diarrhea, nausea and vomiting.   Musculoskeletal: Negative for arthralgias, back pain and myalgias.   Neurological: Negative for dizziness, syncope and headaches.   Psychiatric/Behavioral: Negative for confusion and sleep disturbance.       Objective   /82   Pulse 73   Temp 97.6 °F (36.4 °C)   Ht 161.3 cm (63.5\")   Wt 130 kg (286 lb)   LMP 09/08/2022 (Exact Date)   SpO2 99% Comment: ra  BMI 49.87 kg/m²   Physical Exam  Vitals and nursing note reviewed.   Constitutional:       Appearance: She is well-developed.   HENT:      Head: Normocephalic and atraumatic.      Right Ear: External ear normal.      Left Ear: External ear normal.      Mouth/Throat:      Pharynx: No oropharyngeal exudate.   Eyes:      Conjunctiva/sclera: Conjunctivae normal.      Pupils: Pupils are equal, round, and reactive to light.   Neck:      Thyroid: No thyromegaly.   Cardiovascular:      Rate and Rhythm: Normal rate and regular rhythm.      Pulses: Normal pulses.      Heart sounds: Normal heart sounds. No murmur heard.    No friction rub. No gallop.   Pulmonary:      Effort: Pulmonary effort is normal.      Breath sounds: Normal breath sounds.   Abdominal:      General: Bowel sounds are normal. There is no distension.      Palpations: Abdomen is soft.      Tenderness: There is no abdominal tenderness.   Musculoskeletal:      Cervical back: Neck supple.   Skin:     General: Skin is warm and dry.   Neurological:      Mental Status: She is alert and oriented to person, place, and time.      Cranial Nerves: No cranial " nerve deficit.   Psychiatric:         Judgment: Judgment normal.           Results for orders placed or performed in visit on 08/29/22   CBC (No Diff)    Specimen: Blood   Result Value Ref Range    WBC 6.51 3.40 - 10.80 10*3/mm3    RBC 3.96 3.77 - 5.28 10*6/mm3    Hemoglobin 12.6 12.0 - 15.9 g/dL    Hematocrit 39.3 34.0 - 46.6 %    MCV 99.2 (H) 79.0 - 97.0 fL    MCH 31.8 26.6 - 33.0 pg    MCHC 32.1 31.5 - 35.7 g/dL    RDW 13.4 12.3 - 15.4 %    RDW-SD 48.9 37.0 - 54.0 fl    MPV 12.0 6.0 - 12.0 fL    Platelets 175 140 - 450 10*3/mm3   Comprehensive Metabolic Panel    Specimen: Blood   Result Value Ref Range    Glucose 78 65 - 99 mg/dL    BUN 11 6 - 20 mg/dL    Creatinine 0.70 0.57 - 1.00 mg/dL    Sodium 136 136 - 145 mmol/L    Potassium 4.4 3.5 - 5.2 mmol/L    Chloride 107 98 - 107 mmol/L    CO2 21.0 (L) 22.0 - 29.0 mmol/L    Calcium 8.9 8.6 - 10.5 mg/dL    Total Protein 6.2 6.0 - 8.5 g/dL    Albumin 3.90 3.50 - 5.20 g/dL    ALT (SGPT) 12 1 - 33 U/L    AST (SGOT) 18 1 - 32 U/L    Alkaline Phosphatase 81 39 - 117 U/L    Total Bilirubin 0.2 0.0 - 1.2 mg/dL    Globulin 2.3 gm/dL    A/G Ratio 1.7 g/dL    BUN/Creatinine Ratio 15.7 7.0 - 25.0    Anion Gap 8.0 5.0 - 15.0 mmol/L    eGFR 118.0 >60.0 mL/min/1.73   Lipid Panel    Specimen: Blood   Result Value Ref Range    Total Cholesterol 166 0 - 200 mg/dL    Triglycerides 195 (H) 0 - 150 mg/dL    HDL Cholesterol 40 40 - 60 mg/dL    LDL Cholesterol  93 0 - 100 mg/dL    VLDL Cholesterol 33 5 - 40 mg/dL    LDL/HDL Ratio 2.18    TSH    Specimen: Blood   Result Value Ref Range    TSH 2.280 0.270 - 4.200 uIU/mL   T4, Free    Specimen: Blood   Result Value Ref Range    Free T4 0.79 (L) 0.93 - 1.70 ng/dL   Vitamin D 25 Hydroxy    Specimen: Blood   Result Value Ref Range    25 Hydroxy, Vitamin D 15.9 (L) 30.0 - 100.0 ng/ml   Vitamin B12    Specimen: Blood   Result Value Ref Range    Vitamin B-12 336 211 - 946 pg/mL   Hemoglobin A1c    Specimen: Blood   Result Value Ref Range     Hemoglobin A1C 5.20 4.80 - 5.60 %   HIV-1/O/2 Ag/Ab w Reflex    Specimen: Blood   Result Value Ref Range    HIV-1/ HIV-2 Non-Reactive Non-Reactive   Hepatitis B surface antigen    Specimen: Blood   Result Value Ref Range    Hepatitis B Surface Ag Non-Reactive Non-Reactive   Hepatitis C Antibody    Specimen: Blood   Result Value Ref Range    Hepatitis C Ab Non-Reactive Non-Reactive             Assessment & Plan   Diagnoses and all orders for this visit:    Morbid (severe) obesity due to excess calories (HCC)  -     phentermine (ADIPEX-P) 37.5 MG tablet; Take 1 tablet by mouth Every Morning Before Breakfast.  -     She is advised to take over-the-counter B12 injections at 1000 MCG.  -     We have sent in 2 months of B12 prescriptions.  -     She is to control her carbohydrates and unhealthy foods.  -     She will have healthy whole grain, unprocessed foods.    Other orders  -     vitamin B-12 (CYANOCOBALAMIN) 1000 MCG tablet; Take 1,000 mcg by mouth Daily.    Commended on her weight loss.         The patient has read and signed the [facility] Controlled Substance Contract.  I will continue to see patient for regular follow up appointments.  They are well controlled on their medication.  KADEN has been reviewed by me and is updated every 3 months. The patient is aware of the potential for addiction and dependence.      Return in about 1 year (around 9/26/2023) for Annual .    Electronically signed by:    Nancy Melgar MD     Transcribed from ambient dictation for Nancy Melgar MD by Christy Arriaga.  09/26/22   18:45 EDT    Patient verbalized consent to the visit recording.  I have personally performed the services described in this document as transcribed by the above individual, and it is both accurate and complete.  Nancy Melgar MD  9/27/2022  00:00 EDT

## 2022-10-26 ENCOUNTER — TELEPHONE (OUTPATIENT)
Dept: INTERNAL MEDICINE | Facility: CLINIC | Age: 32
End: 2022-10-26

## 2022-10-26 NOTE — TELEPHONE ENCOUNTER
Please find out if she has lost a fair amount of weight on the Adipex/phentermine.  If yes, then that can cause some irregular periods due to hormonal changes with the weight loss.    If her spotting is minimal she can monitor it for now but if it becomes excessive then should stop the phentermine and follow-up if not better

## 2022-10-26 NOTE — TELEPHONE ENCOUNTER
Caller: Kash Laboy    Relationship: Self    Best call back number:[phone]    What is the best time to reach you: ANY    What was the call regarding: KASH SAYS SHE IS HAVING VAGINAL SPOTTING.  HER PERIOD ENDED [id number]    Do you require a callback: YES

## 2023-09-29 ENCOUNTER — OFFICE VISIT (OUTPATIENT)
Dept: INTERNAL MEDICINE | Facility: CLINIC | Age: 33
End: 2023-09-29
Payer: COMMERCIAL

## 2023-09-29 ENCOUNTER — LAB (OUTPATIENT)
Dept: LAB | Facility: HOSPITAL | Age: 33
End: 2023-09-29
Payer: COMMERCIAL

## 2023-09-29 VITALS
DIASTOLIC BLOOD PRESSURE: 72 MMHG | HEIGHT: 64 IN | BODY MASS INDEX: 49.17 KG/M2 | HEART RATE: 85 BPM | WEIGHT: 288 LBS | SYSTOLIC BLOOD PRESSURE: 124 MMHG | TEMPERATURE: 98.3 F | OXYGEN SATURATION: 99 %

## 2023-09-29 DIAGNOSIS — E66.01 MORBID (SEVERE) OBESITY DUE TO EXCESS CALORIES: ICD-10-CM

## 2023-09-29 DIAGNOSIS — Z00.00 ROUTINE GENERAL MEDICAL EXAMINATION AT A HEALTH CARE FACILITY: Primary | ICD-10-CM

## 2023-09-29 DIAGNOSIS — F98.8 ADD (ATTENTION DEFICIT DISORDER) WITHOUT HYPERACTIVITY: ICD-10-CM

## 2023-09-29 DIAGNOSIS — E55.9 VITAMIN D DEFICIENCY: ICD-10-CM

## 2023-09-29 DIAGNOSIS — F43.9 SITUATIONAL STRESS: ICD-10-CM

## 2023-09-29 DIAGNOSIS — R31.9 URINARY TRACT INFECTION WITH HEMATURIA, SITE UNSPECIFIED: ICD-10-CM

## 2023-09-29 DIAGNOSIS — Z83.3 FAMILY HISTORY OF DIABETES MELLITUS: ICD-10-CM

## 2023-09-29 DIAGNOSIS — N39.0 URINARY TRACT INFECTION WITH HEMATURIA, SITE UNSPECIFIED: ICD-10-CM

## 2023-09-29 DIAGNOSIS — Z00.00 ROUTINE GENERAL MEDICAL EXAMINATION AT A HEALTH CARE FACILITY: ICD-10-CM

## 2023-09-29 LAB
BILIRUB BLD-MCNC: NEGATIVE MG/DL
CLARITY, POC: ABNORMAL
COLOR UR: ABNORMAL
DEPRECATED RDW RBC AUTO: 45.5 FL (ref 37–54)
ERYTHROCYTE [DISTWIDTH] IN BLOOD BY AUTOMATED COUNT: 13 % (ref 12.3–15.4)
EXPIRATION DATE: ABNORMAL
GLUCOSE UR STRIP-MCNC: NEGATIVE MG/DL
HBA1C MFR BLD: 5.3 % (ref 4.8–5.6)
HCT VFR BLD AUTO: 38.5 % (ref 34–46.6)
HGB BLD-MCNC: 12.6 G/DL (ref 12–15.9)
KETONES UR QL: NEGATIVE
LEUKOCYTE EST, POC: ABNORMAL
Lab: ABNORMAL
MCH RBC QN AUTO: 31.3 PG (ref 26.6–33)
MCHC RBC AUTO-ENTMCNC: 32.7 G/DL (ref 31.5–35.7)
MCV RBC AUTO: 95.8 FL (ref 79–97)
NITRITE UR-MCNC: NEGATIVE MG/ML
PH UR: 6 [PH] (ref 5–8)
PLATELET # BLD AUTO: 249 10*3/MM3 (ref 140–450)
PMV BLD AUTO: 11.7 FL (ref 6–12)
PROT UR STRIP-MCNC: ABNORMAL MG/DL
RBC # BLD AUTO: 4.02 10*6/MM3 (ref 3.77–5.28)
RBC # UR STRIP: ABNORMAL /UL
SP GR UR: 1.02 (ref 1–1.03)
UROBILINOGEN UR QL: NORMAL
WBC NRBC COR # BLD: 5.05 10*3/MM3 (ref 3.4–10.8)

## 2023-09-29 PROCEDURE — 85027 COMPLETE CBC AUTOMATED: CPT

## 2023-09-29 PROCEDURE — 80053 COMPREHEN METABOLIC PANEL: CPT

## 2023-09-29 PROCEDURE — 83036 HEMOGLOBIN GLYCOSYLATED A1C: CPT

## 2023-09-29 PROCEDURE — 82306 VITAMIN D 25 HYDROXY: CPT

## 2023-09-29 PROCEDURE — 80061 LIPID PANEL: CPT

## 2023-09-29 PROCEDURE — 87086 URINE CULTURE/COLONY COUNT: CPT | Performed by: INTERNAL MEDICINE

## 2023-09-29 PROCEDURE — 84443 ASSAY THYROID STIM HORMONE: CPT

## 2023-09-29 RX ORDER — FLUOXETINE HYDROCHLORIDE 20 MG/1
20 CAPSULE ORAL DAILY
Qty: 90 CAPSULE | Refills: 3 | Status: SHIPPED | OUTPATIENT
Start: 2023-09-29

## 2023-09-29 NOTE — PROGRESS NOTES
Chief Complaint   Patient presents with    Annual Exam       History of Present Illness  , Adult Female: The patient is being seen for a health maintenance and evaluation. The last health maintenance visit was 1 year(s) ago.   Social History: She is single. Work status: full time  She has smokes. She reports never drinking alcohol. Denies any illicit drug use.  General Health: The patient's health is described as good. She has regular dental visits. She denies vision problems. She denies hearing loss. Immunizations status: up to date.   Lifestyle:. She consumes a diverse and healthy diet. She does have any weight concerns. She exercises irregularly- stays active . She denies tobacco. She denies alcohol use. She denies drug use.   Reproductive health:. She reports normal menses, occ missed period.   Screening: Cancer screening reviewed and current.   Grandmother - paternal and half brother with breast cancer, she was advised to have Mamm starting the age of 35.  Metabolic screening reviewed and current.   Risk screening reviewed and current. .     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 Feels like she is all over the place.  Having issues with completing tasks.  Also has an appt with Gyn, to check for her fertility / pcos or any other issues. Her periods are regular.    Review of Systems   Constitutional:  Negative for chills and fatigue.   HENT:  Negative for congestion, ear pain and sore throat.    Eyes:  Negative for pain, redness and visual disturbance.   Respiratory:  Negative for cough and shortness of breath.    Cardiovascular:  Negative for chest pain, palpitations and leg swelling.   Gastrointestinal:  Negative for abdominal pain, diarrhea and nausea.   Endocrine: Negative for cold intolerance and heat intolerance.   Genitourinary:  Negative for flank pain and urgency.   Musculoskeletal:  Negative for arthralgias and gait problem.   Skin:  Negative for pallor and rash.   Neurological:   "Negative for dizziness, weakness and headaches.   Psychiatric/Behavioral:  Negative for dysphoric mood and sleep disturbance. The patient is not nervous/anxious.      Patient Active Problem List   Diagnosis    ASCUS with positive high risk HPV cervical       Social History     Socioeconomic History    Marital status: Single   Tobacco Use    Smoking status: Every Day     Packs/day: 1.00     Years: 5.00     Pack years: 5.00     Types: Cigarettes     Start date: 9/2/2013    Smokeless tobacco: Never    Tobacco comments:     When stressed i smoke more   Vaping Use    Vaping Use: Never used   Substance and Sexual Activity    Alcohol use: Yes     Comment: Socially    Drug use: Yes     Types: Marijuana    Sexual activity: Yes     Partners: Male       Current Outpatient Medications on File Prior to Visit   Medication Sig Dispense Refill    metaxalone (SKELAXIN) 800 MG tablet Take 1 tablet by mouth 3 (Three) Times a Day As Needed for Muscle Spasms. 15 tablet 0    naproxen (NAPROSYN) 500 MG tablet Take 1 tablet by mouth 2 (Two) Times a Day With Meals. 14 tablet 0    vitamin B-12 (CYANOCOBALAMIN) 1000 MCG tablet Take 1 tablet by mouth Daily.      [DISCONTINUED] FLUoxetine (PROzac) 20 MG capsule Take 1 capsule by mouth Daily. 90 capsule 3    [DISCONTINUED] buPROPion XL (Wellbutrin XL) 150 MG 24 hr tablet 1 po daily x 1 week then 2 PO daily 60 tablet 5    [DISCONTINUED] phentermine (ADIPEX-P) 37.5 MG tablet Take 1 tablet by mouth Every Morning Before Breakfast. 30 tablet 1    [DISCONTINUED] vitamin D (ERGOCALCIFEROL) 1.25 MG (37513 UT) capsule capsule Take 1 capsule by mouth 1 (One) Time Per Week. 12 capsule 1     No current facility-administered medications on file prior to visit.       No Known Allergies    /72   Pulse 85   Temp 98.3 °F (36.8 °C)   Ht 161.3 cm (63.5\")   Wt 131 kg (288 lb)   LMP 09/23/2023 (Exact Date)   SpO2 99% Comment: ra  BMI 50.22 kg/m²            The following portions of the patient's history " were reviewed and updated as appropriate: allergies, current medications, past family history, past medical history, past social history, past surgical history, and problem list.    Physical Exam  Constitutional:       General: She is not in acute distress.     Appearance: Normal appearance.   HENT:      Head: Normocephalic and atraumatic.      Right Ear: Tympanic membrane and external ear normal.      Left Ear: Tympanic membrane and external ear normal.      Nose: Nose normal.      Mouth/Throat:      Mouth: Mucous membranes are moist.   Eyes:      General: No scleral icterus.  Neck:      Vascular: No carotid bruit.   Cardiovascular:      Rate and Rhythm: Normal rate and regular rhythm.      Pulses: Normal pulses.      Heart sounds: Normal heart sounds. No murmur heard.    No friction rub. No gallop.   Pulmonary:      Effort: Pulmonary effort is normal.      Breath sounds: Normal breath sounds. No rhonchi or rales.   Abdominal:      General: Bowel sounds are normal. There is no distension.      Palpations: Abdomen is soft.      Tenderness: There is no right CVA tenderness, left CVA tenderness, guarding or rebound.      Hernia: No hernia is present.   Musculoskeletal:         General: No tenderness. Normal range of motion.      Cervical back: Normal range of motion.      Right lower leg: No edema.      Left lower leg: No edema.   Lymphadenopathy:      Cervical: No cervical adenopathy.   Skin:     General: Skin is warm.      Findings: No rash.   Neurological:      General: No focal deficit present.      Mental Status: She is alert and oriented to person, place, and time. Mental status is at baseline.      Cranial Nerves: No cranial nerve deficit.      Sensory: No sensory deficit.      Coordination: Coordination normal.      Gait: Gait normal.      Deep Tendon Reflexes: Reflexes normal.   Psychiatric:         Mood and Affect: Mood normal.         Behavior: Behavior normal.       Class 3 Severe Obesity (BMI >=40).  Obesity-related health conditions include the following: hypertension and dyslipidemias. Obesity is unchanged. BMI is is above average; BMI management plan is completed. We discussed portion control and increasing exercise.       Results for orders placed or performed in visit on 09/29/23   POCT urinalysis dipstick, automated    Specimen: Urine   Result Value Ref Range    Color Dark Yellow Yellow, Straw, Dark Yellow, Kelsey    Clarity, UA Cloudy (A) Clear    Specific Gravity  1.025 1.005 - 1.030    pH, Urine 6.0 5.0 - 8.0    Leukocytes 500 Fernando/ul (A) Negative    Nitrite, UA Negative Negative    Protein, POC Trace (A) Negative mg/dL    Glucose, UA Negative Negative mg/dL    Ketones, UA Negative Negative    Urobilinogen, UA Normal Normal, 0.2 E.U./dL    Bilirubin Negative Negative    Blood, UA 3+ (A) Negative    Lot Number [id number]     Expiration Date 11/21/24        Diagnoses and all orders for this visit:    1. Routine general medical examination at a health care facility (Primary)  -     POCT urinalysis dipstick, automated  -     FLUoxetine (PROzac) 20 MG capsule; Take 1 capsule by mouth Daily.  Dispense: 90 capsule; Refill: 3  -     CBC (No Diff); Future  -     Comprehensive Metabolic Panel; Future  -     Lipid Panel; Future  -     TSH Rfx On Abnormal To Free T4; Future    2. Situational stress  -     FLUoxetine (PROzac) 20 MG capsule; Take 1 capsule by mouth Daily.  Dispense: 90 capsule; Refill: 3    3. Morbid (severe) obesity due to excess calories  -     Semaglutide-Weight Management 0.25 MG/0.5ML solution auto-injector; Inject 0.25 mg under the skin into the appropriate area as directed Every 7 (Seven) Days for 4 doses. Inject weekly for 4 weeks, then increase to next dose.  Dispense: 2 mL; Refill: 0  -     Semaglutide-Weight Management 0.5 MG/0.5ML solution auto-injector; Inject 0.5 mL under the skin into the appropriate area as directed Every 7 (Seven) Days. .  Dispense: 2 mL; Refill: 3    4. ADD  (attention deficit disorder) without hyperactivity  -     Ambulatory Referral to Behavioral Health    5. Family history of diabetes mellitus  -     Hemoglobin A1c; Future    6. Vitamin D deficiency  -     Vitamin D,25-Hydroxy; Future    7. Urinary tract infection with hematuria, site unspecified  -     Urine Culture - Urine, Urine, Clean Catch; Future  -     Urine Culture - Urine, Urine, Clean Catch          Health Maintenance   Topic Date Due    COVID-19 Vaccine (1) 12/19/2023 (Originally 2/25/1991)    INFLUENZA VACCINE  03/31/2024 (Originally 8/1/2023)    Pneumococcal Vaccine 0-64 (1 - PCV) 09/29/2024 (Originally 8/25/1996)    ANNUAL PHYSICAL  09/29/2024    BMI FOLLOWUP  09/29/2024    PAP SMEAR  08/29/2025    TDAP/TD VACCINES (2 - Td or Tdap) 01/27/2027    HEPATITIS C SCREENING  Completed       Discussion/Summary  Impression: health maintenance visit. Currently, she eats an adequate diet and has an adequate exercise regimen.   Cervical cancer screening:Pap smear is due- to se Gyn.  Breast cancer screening: mammogram is due at the age of 35.  Colorectal cancer screening: colonoscopy is current.  Osteoporosis screening: Bone mineral density test is not indicated.   CT low dose screen - not indicated.  Screening lab work includes hemoglobin, glucose, lipid profile, thyroid function testing, 25-hydroxyvitamin D and urinalysis.   Immunizations are needed, immunizations will be given as outlined in the orders   Advice and education were given regarding cardiovascular risk reduction, healthy dietary habits, Seatbelt and helmet use and self skin examination.     Return in about 1 year (around 9/29/2024) for Annual.      Electronically signed by:    aNncy Melgar MD

## 2023-09-30 LAB
25(OH)D3 SERPL-MCNC: 18.2 NG/ML (ref 30–100)
ALBUMIN SERPL-MCNC: 4 G/DL (ref 3.5–5.2)
ALBUMIN/GLOB SERPL: 1.7 G/DL
ALP SERPL-CCNC: 98 U/L (ref 39–117)
ALT SERPL W P-5'-P-CCNC: 15 U/L (ref 1–33)
ANION GAP SERPL CALCULATED.3IONS-SCNC: 10 MMOL/L (ref 5–15)
AST SERPL-CCNC: 16 U/L (ref 1–32)
BACTERIA SPEC AEROBE CULT: NORMAL
BILIRUB SERPL-MCNC: 0.3 MG/DL (ref 0–1.2)
BUN SERPL-MCNC: 9 MG/DL (ref 6–20)
BUN/CREAT SERPL: 12 (ref 7–25)
CALCIUM SPEC-SCNC: 9.2 MG/DL (ref 8.6–10.5)
CHLORIDE SERPL-SCNC: 105 MMOL/L (ref 98–107)
CHOLEST SERPL-MCNC: 160 MG/DL (ref 0–200)
CO2 SERPL-SCNC: 25 MMOL/L (ref 22–29)
CREAT SERPL-MCNC: 0.75 MG/DL (ref 0.57–1)
EGFRCR SERPLBLD CKD-EPI 2021: 108 ML/MIN/1.73
GLOBULIN UR ELPH-MCNC: 2.4 GM/DL
GLUCOSE SERPL-MCNC: 80 MG/DL (ref 65–99)
HDLC SERPL-MCNC: 45 MG/DL (ref 40–60)
LDLC SERPL CALC-MCNC: 94 MG/DL (ref 0–100)
LDLC/HDLC SERPL: 2.03 {RATIO}
POTASSIUM SERPL-SCNC: 4.3 MMOL/L (ref 3.5–5.2)
PROT SERPL-MCNC: 6.4 G/DL (ref 6–8.5)
SODIUM SERPL-SCNC: 140 MMOL/L (ref 136–145)
TRIGL SERPL-MCNC: 119 MG/DL (ref 0–150)
TSH SERPL DL<=0.05 MIU/L-ACNC: 3.43 UIU/ML (ref 0.27–4.2)
VLDLC SERPL-MCNC: 21 MG/DL (ref 5–40)

## 2023-10-06 RX ORDER — ERGOCALCIFEROL 1.25 MG/1
50000 CAPSULE ORAL WEEKLY
Qty: 12 CAPSULE | Refills: 1 | Status: SHIPPED | OUTPATIENT
Start: 2023-10-06

## 2023-10-21 PROBLEM — Z01.419 WELL WOMAN EXAM: Status: ACTIVE | Noted: 2023-10-21

## 2023-10-21 PROBLEM — R87.610 ASCUS WITH POSITIVE HIGH RISK HPV CERVICAL: Status: RESOLVED | Noted: 2018-09-05 | Resolved: 2023-10-21

## 2023-10-21 PROBLEM — R87.810 ASCUS WITH POSITIVE HIGH RISK HPV CERVICAL: Status: RESOLVED | Noted: 2018-09-05 | Resolved: 2023-10-21

## 2023-10-27 ENCOUNTER — OFFICE VISIT (OUTPATIENT)
Dept: OBSTETRICS AND GYNECOLOGY | Facility: CLINIC | Age: 33
End: 2023-10-27
Payer: COMMERCIAL

## 2023-10-27 VITALS
SYSTOLIC BLOOD PRESSURE: 126 MMHG | DIASTOLIC BLOOD PRESSURE: 80 MMHG | WEIGHT: 286 LBS | BODY MASS INDEX: 49.87 KG/M2 | RESPIRATION RATE: 14 BRPM

## 2023-10-27 DIAGNOSIS — Z01.419 WELL WOMAN EXAM: Primary | ICD-10-CM

## 2023-10-27 DIAGNOSIS — Z71.85 VACCINE COUNSELING: ICD-10-CM

## 2023-10-27 DIAGNOSIS — Z80.3 FAMILY HISTORY OF BREAST CANCER: ICD-10-CM

## 2023-10-27 PROBLEM — F41.9 ANXIETY: Status: ACTIVE | Noted: 2017-01-01

## 2023-10-27 PROCEDURE — 99385 PREV VISIT NEW AGE 18-39: CPT | Performed by: OBSTETRICS & GYNECOLOGY
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
## 2023-10-27 NOTE — PATIENT INSTRUCTIONS

## 2023-10-30 ENCOUNTER — PATIENT ROUNDING (BHMG ONLY) (OUTPATIENT)
Dept: OBSTETRICS AND GYNECOLOGY | Facility: CLINIC | Age: 33
End: 2023-10-30
Payer: COMMERCIAL

## 2023-10-30 NOTE — PROGRESS NOTES
A 140 Proof message has been sent to the patient for PATIENT R[place]ING with [facility]

## 2023-10-31 ENCOUNTER — OFFICE VISIT (OUTPATIENT)
Dept: BEHAVIORAL HEALTH | Facility: CLINIC | Age: 33
End: 2023-10-31
Payer: COMMERCIAL

## 2023-10-31 VITALS
SYSTOLIC BLOOD PRESSURE: 128 MMHG | DIASTOLIC BLOOD PRESSURE: 84 MMHG | HEART RATE: 85 BPM | OXYGEN SATURATION: 99 % | HEIGHT: 64 IN | BODY MASS INDEX: 48.65 KG/M2 | WEIGHT: 285 LBS

## 2023-10-31 DIAGNOSIS — F41.9 ANXIETY: ICD-10-CM

## 2023-10-31 DIAGNOSIS — R41.840 POOR CONCENTRATION: Primary | ICD-10-CM

## 2023-10-31 PROCEDURE — 90792 PSYCH DIAG EVAL W/MED SRVCS: CPT | Performed by: REGISTERED NURSE

## 2023-10-31 RX ORDER — BUPROPION HYDROCHLORIDE 150 MG/1
150 TABLET ORAL EVERY MORNING
Qty: 30 TABLET | Refills: 0 | Status: SHIPPED | OUTPATIENT
Start: 2023-10-31
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
## 2023-11-01 ENCOUNTER — TELEPHONE (OUTPATIENT)
Dept: OBSTETRICS AND GYNECOLOGY | Facility: CLINIC | Age: 33
End: 2023-11-01
Payer: COMMERCIAL

## 2023-11-01 NOTE — TELEPHONE ENCOUNTER
ITA    Received call from the patient inquiring regarding test results. If someone could please give her a call back it would be most appreciated.     Thank you kindly!

## 2023-11-02 ENCOUNTER — TELEPHONE (OUTPATIENT)
Dept: OBSTETRICS AND GYNECOLOGY | Facility: CLINIC | Age: 33
End: 2023-11-02
Payer: COMMERCIAL

## 2023-11-02 DIAGNOSIS — N92.0 MENORRHAGIA WITH REGULAR CYCLE: Primary | ICD-10-CM

## 2023-11-02 PROBLEM — D25.0 FIBROIDS, SUBMUCOSAL: Status: ACTIVE | Noted: 2023-11-02

## 2023-11-02 NOTE — TELEPHONE ENCOUNTER
Would like to get her set up for a saline infusion sonogram as the next step to determine how much the fibroid is protruding into the cavity for we can talk about ways to make this better.

## 2023-11-02 NOTE — TELEPHONE ENCOUNTER
Please call patient and let her know ultrasound did show she has a small fibroid inside the uterus.  Although it is not large its location can be associated with causing heavy menses.  Can you find out how bothered she has by her heavy menses?  If it is significantly impacting her day-to-day function there is some pretty simple things we can do to look into this further and probably make her cycles better

## 2023-11-28 ENCOUNTER — OFFICE VISIT (OUTPATIENT)
Dept: BEHAVIORAL HEALTH | Facility: CLINIC | Age: 33
End: 2023-11-28
Payer: COMMERCIAL

## 2023-11-28 VITALS
WEIGHT: 278 LBS | HEART RATE: 98 BPM | SYSTOLIC BLOOD PRESSURE: 124 MMHG | OXYGEN SATURATION: 99 % | DIASTOLIC BLOOD PRESSURE: 78 MMHG | HEIGHT: 64 IN | BODY MASS INDEX: 47.46 KG/M2

## 2023-11-28 DIAGNOSIS — F41.9 ANXIETY: Primary | ICD-10-CM

## 2023-11-28 DIAGNOSIS — R41.840 POOR CONCENTRATION: ICD-10-CM

## 2023-11-28 NOTE — PROGRESS NOTES
Follow Up Office Visit      Patient Name: Zi Laboy  : 1990   MRN: [id number]     Referring Provider: Nancy Melgar MD    Chief Complaint:      ICD-10-CM ICD-9-CM   1. Anxiety  F41.9 300.00   2. Poor concentration  R41.840 799.51        History of Present Illness:   Zi Laboy is a 33 y.o. female who is here today for follow up and medication management    Subjective      Patient Reports: that she did not start the bupropion. She reports that she plans to take the testing for ADHD, but has not had the money for it at this time. She reports that she plans to do it within the next couple of days and then will begin the medication. She reports that she has lost 7 lbs in a month, because she has changed to a vegetarian diet. She reports that she changed her diet because she has a small fibroid, but would also benefit from losing the weight. She reports that she is moving because her rent is too high, but she is having trouble finding a place that she can afford. She reports that therapy has been helpful and she has learned how to not have negative self talk. Denies SI/HI/AVH.    Review of Systems:   Review of Systems   Constitutional:  Negative for appetite change and unexpected weight change.   Eyes:  Negative for visual disturbance.   Respiratory:  Negative for chest tightness and shortness of breath.    Cardiovascular:  Negative for chest pain.   Musculoskeletal:  Negative for gait problem.   Skin:  Negative for rash and wound.   Neurological:  Negative for dizziness, tremors, seizures, weakness and light-headedness.   Psychiatric/Behavioral:  Positive for decreased concentration and sleep disturbance. Negative for agitation, behavioral problems, confusion, dysphoric mood, hallucinations, self-injury and suicidal ideas. The patient is not nervous/anxious and is not hyperactive.      Sleep pattern: had covid,   Appetite: going vegan.      Screening Scores:   PHQ-9 : 6  CORTNEY-7 : 12    RISK  "ASSESSMENT:  Patient denies any thoughts or intent of suicide today. Patient denies any impulsive behavior today.     Medications:     Current Outpatient Medications:     buPROPion XL (Wellbutrin XL) 150 MG 24 hr tablet, Take 1 tablet by mouth Every Morning., Disp: 30 tablet, Rfl: 0    metaxalone (SKELAXIN) 800 MG tablet, Take 1 tablet by mouth 3 (Three) Times a Day As Needed for Muscle Spasms., Disp: 15 tablet, Rfl: 0    naproxen (NAPROSYN) 500 MG tablet, Take 1 tablet by mouth 2 (Two) Times a Day With Meals., Disp: 14 tablet, Rfl: 0    Semaglutide-Weight Management 0.5 MG/0.5ML solution auto-injector, Inject 0.5 mL under the skin into the appropriate area as directed Every 7 (Seven) Days. ., Disp: 2 mL, Rfl: 3    vitamin B-12 (CYANOCOBALAMIN) 1000 MCG tablet, Take 1 tablet by mouth Daily., Disp: , Rfl:     vitamin D (ERGOCALCIFEROL) 1.25 MG (28578 UT) capsule capsule, Take 1 capsule by mouth 1 (One) Time Per Week., Disp: 12 capsule, Rfl: 1    Medication Considerations:  KADEN reviewed and appropriate.      Allergies:   No Known Allergies      Objective     Physical Exam:  Vital Signs:   Vitals:    11/28/23 1206 11/28/23 1208   BP:  124/78   Pulse:  98   SpO2:  99%   Weight: 126 kg (278 lb)    Height: 161.3 cm (63.5\")      Body mass index is 48.47 kg/m².     Mental Status Exam:   Hygiene:   good  Cooperation:  Cooperative  Eye Contact:  Good  Psychomotor Behavior:  Appropriate  Affect:  Appropriate  Mood: normal  Speech:  Normal  Thought Process:  Goal directed and Linear  Thought Content:  Normal  Suicidal:  None  Homicidal:  None  Hallucinations:  None  Delusion:  None  Memory:  Intact  Orientation:  Person, Place, Time, and Situation  Reliability:  good  Insight:  Good  Judgement:  Good  Impulse Control:  Good  Physical/Medical Issues:   see problem list      Assessment / Plan      Visit Diagnosis/Orders Placed This Visit:  Diagnoses and all orders for this visit:    1. Anxiety (Primary)    2. Poor " concentration         Functional Status: No impairment    Prognosis: Good with Ongoing Treatment     Impression/Formulation:  Patient appeared alert and oriented.  Patient is voluntarily requesting to continue outpatient psychiatric treatment at [facility].  Patient is receptive to assistance with maintaining a stable lifestyle.  Patient presents with history of     ICD-10-CM ICD-9-CM   1. Anxiety  F41.9 300.00   2. Poor concentration  R41.840 799.51     Reviewed patient's previous provider notes. Patient meets DSM V diagnostic criteria for diagnoses. Diagnoses may be updated as more information becomes available.       Treatment Plan:   Begin bupropion xl 150 mg daily  Complete ADHD testing  Continue individual testing  Follow up in 1 month or sooner if needed  Patient will continue supportive psychotherapy efforts and medications as indicated. Clinic will obtain release of information for current treatment team for continuity of care as needed. Patient will contact this office, call 911 or present to the nearest emergency room should suicidal or homicidal ideations occur.  Discussed medication options and treatment plan of prescribed medication(s) as well as the risks, benefits, and potential side effects. Patient ackowledged and verbally consented to continue with current treatment plan and was educated on the importance of compliance with treatment and follow-up appointments.     Patient instructions:  Instructed will take 4-6 weeks for full therapeutic effect. Medication risks and side effects discussed with patient including risk for worsening mood, changes in behavior, thoughts of suicide or homicide, induction of isiah, serotonin syndrome.   If any thoughts of SI or HI, worsening mood or changes in behavior, call 911 or crisis line [phone], or go to nearest ER at once. Pt.verbalizes understanding and consents to treatment with this medication.     Follow Up:   Return in about 1 month  (around 12/28/2023) for Med Check.        JARED Oliveros, PMHNP-BC [facility]

## 2023-12-02 NOTE — PROGRESS NOTES
Saline Infusion Sonogram     Date of procedure:  December 7, 2023     Risks and benefits were discussed.  All questions were answered.  Consents given by the patient verbally.     Pre-op indication:  Menorrhagia  Suspected sub-mucosal fibroids     Procedure documentation:    After the patient was identified and informed consent given she was placed in dorsal lithotomy position in stirrups and draped. A sterile speculum was placed inside the vagina with good visualization of the cervix and the cervix was cleaned with Betadine swabs.  The cervix was grasped with a single-tooth tenaculum.  A balloon catheter was introduced through the cervix without complication and inflated. The speculum was removed. The uterine cavity was then evaluated with transvaginal ultrasonography while saline was being instilled.    The findings were as follows:  The bilayer endometrial stripe measured < 5 mm.  Focal lesions were present    The balloon was then released and the cavity was then drained of saline and the catheter was removed. Scant bleeding was noted from the cervical lip and the procedure was completed. The patient tolerated the procedure well and was given follow-up instructions.      Impression: Submucosal fibroid - type 1   Plan: Hysteroscopic evalulation of the cavity with probable Myosure  Advised to avoid weight loss injectable medication for at least 2 weeks prior to the procedure     This note was electronically signed.    Eliceo Griffith M.D.  December 7, 2023

## 2023-12-07 ENCOUNTER — OFFICE VISIT (OUTPATIENT)
Dept: OBSTETRICS AND GYNECOLOGY | Facility: CLINIC | Age: 33
End: 2023-12-07
Payer: COMMERCIAL

## 2023-12-07 DIAGNOSIS — N92.0 MENORRHAGIA WITH REGULAR CYCLE: Primary | ICD-10-CM

## 2023-12-07 DIAGNOSIS — D25.0 FIBROIDS, SUBMUCOSAL: ICD-10-CM

## 2023-12-19 ENCOUNTER — TELEPHONE (OUTPATIENT)
Dept: BEHAVIORAL HEALTH | Facility: CLINIC | Age: 33
End: 2023-12-19
Payer: COMMERCIAL

## 2023-12-19 NOTE — TELEPHONE ENCOUNTER
PATIENT IS CALLING BECAUSE SHE IS SUPPOSED TO TAKE QUESTIONNAIRE TO TEST HER FOR ADHD. PATIENT STATES THAT SHE WOULD LIKE SOMEONE TO CALL HER BACK SO SHE CAN MAKE SURE SHE HAS FOUND THE CORRECT SURVEY BEFORE PAYING FOR IT. PATIENT STATES SHE THINKS SHE IS SUPPOSED TO USE [employer] BUT SHE ISN'T SURE. PLEASE CALL TO ADVISE.

## 2023-12-19 NOTE — TELEPHONE ENCOUNTER
Patient returned call, I advised her that [employer] Online is the correct website, she verbalized understanding. Fax number given if she wants to fax results to the office.

## 2023-12-21 ENCOUNTER — TELEPHONE (OUTPATIENT)
Dept: INTERNAL MEDICINE | Facility: CLINIC | Age: 33
End: 2023-12-21
Payer: COMMERCIAL

## 2023-12-21 NOTE — TELEPHONE ENCOUNTER
Returned Zi's call and spoke to her, she got her ADHD testing results and is going to fax them to our office.

## 2023-12-21 NOTE — TELEPHONE ENCOUNTER
KASH CALLED AND ASKED TO SPEAK WITH [facility]; I TRIED TO REACH MAVERICK BUT NO ANSWER, PLEASE CALL HER [phone]

## 2024-01-03 ENCOUNTER — OFFICE VISIT (OUTPATIENT)
Dept: BEHAVIORAL HEALTH | Facility: CLINIC | Age: 34
End: 2024-01-03
Payer: COMMERCIAL

## 2024-01-03 VITALS
HEIGHT: 63 IN | SYSTOLIC BLOOD PRESSURE: 126 MMHG | WEIGHT: 289 LBS | BODY MASS INDEX: 51.21 KG/M2 | OXYGEN SATURATION: 100 % | HEART RATE: 96 BPM | DIASTOLIC BLOOD PRESSURE: 70 MMHG

## 2024-01-03 DIAGNOSIS — F41.9 ANXIETY: ICD-10-CM

## 2024-01-03 DIAGNOSIS — F90.2 ADHD (ATTENTION DEFICIT HYPERACTIVITY DISORDER), COMBINED TYPE: Primary | ICD-10-CM

## 2024-01-03 DIAGNOSIS — F33.0 MAJOR DEPRESSIVE DISORDER, RECURRENT EPISODE, MILD: ICD-10-CM

## 2024-01-03 RX ORDER — BUPROPION HYDROCHLORIDE 150 MG/1
150 TABLET ORAL EVERY MORNING
Qty: 30 TABLET | Refills: 1 | Status: SHIPPED | OUTPATIENT
Start: 2024-01-03

## 2024-01-03 NOTE — PROGRESS NOTES
Follow Up Office Visit      Patient Name: Zi Laboy  : 1990   MRN: [id number]     Referring Provider: Nancy Melgar MD    Chief Complaint:      ICD-10-CM ICD-9-CM   1. ADHD (attention deficit hyperactivity disorder), combined type  F90.2 314.01   2. Anxiety  F41.9 300.00   3. Major depressive disorder, recurrent episode, mild  F33.0 296.31        History of Present Illness:   Zi Laboy is a 33 y.o. female who is here today for follow up and medication management    Subjective      Patient Reports: That she took online testing for ADHD, and was diagnosed with ADHD and brought her results. She reports that she started bupropion the week after she took the test, and has not quite been on it for two weeks now. She reports that she felt like she got a little bit more done than she usually does, but she has had other stuff going on. She reports that she had to move out of her apartment, but her new lease wasn't ready, so she had to put her things in storage and moved in with a friend. She reports that work is so stressful and she works different shifts every day. She reports that she was able to be off on  and New Years. She reports that she is scheduled to have surgery on  for a fibroid. Denies any side effects. Denies SI/HI/AVH.    Review of Systems:   Review of Systems   Constitutional:  Negative for appetite change and unexpected weight change.   Eyes:  Negative for visual disturbance.   Respiratory:  Negative for chest tightness and shortness of breath.    Cardiovascular:  Negative for chest pain.   Musculoskeletal:  Negative for gait problem.   Skin:  Negative for rash and wound.   Neurological:  Negative for dizziness, tremors, seizures, weakness and light-headedness.   Psychiatric/Behavioral:  Positive for decreased concentration and sleep disturbance. Negative for agitation, behavioral problems, confusion, dysphoric mood, hallucinations, self-injury and suicidal  "ideas. The patient is not nervous/anxious and is not hyperactive.      Sleep pattern: if she takes it too late she has trouble falling asleep, 6 hours of sleep, not exhausted if she takes it  Appetite: no changes      Screening Scores:   PHQ-9 : 15  CORTNEY-7 : 16    RISK ASSESSMENT:  Patient denies any thoughts or intent of suicide today. Patient denies any impulsive behavior today.     Medications:     Current Outpatient Medications:     buPROPion XL (Wellbutrin XL) 150 MG 24 hr tablet, Take 1 tablet by mouth Every Morning., Disp: 30 tablet, Rfl: 1    metaxalone (SKELAXIN) 800 MG tablet, Take 1 tablet by mouth 3 (Three) Times a Day As Needed for Muscle Spasms., Disp: 15 tablet, Rfl: 0    naproxen (NAPROSYN) 500 MG tablet, Take 1 tablet by mouth 2 (Two) Times a Day With Meals., Disp: 14 tablet, Rfl: 0    Semaglutide-Weight Management 0.5 MG/0.5ML solution auto-injector, Inject 0.5 mL under the skin into the appropriate area as directed Every 7 (Seven) Days. ., Disp: 2 mL, Rfl: 3    vitamin B-12 (CYANOCOBALAMIN) 1000 MCG tablet, Take 1 tablet by mouth Daily., Disp: , Rfl:     vitamin D (ERGOCALCIFEROL) 1.25 MG (95789 UT) capsule capsule, Take 1 capsule by mouth 1 (One) Time Per Week., Disp: 12 capsule, Rfl: 1    Medication Considerations:  KADEN reviewed and appropriate.      Allergies:   No Known Allergies      Objective     Physical Exam:  Vital Signs:   Vitals:    01/03/24 1215 01/03/24 1217   BP:  126/70   Pulse:  96   SpO2:  100%   Weight: 131 kg (289 lb)    Height: 158.8 cm (62.5\")      Body mass index is 52.02 kg/m².     Mental Status Exam:   Hygiene:   good  Cooperation:  Cooperative  Eye Contact:  Good  Psychomotor Behavior:  Appropriate  Affect:  Blunted  Mood: anxious  Speech:  Normal  Thought Process:  Goal directed and Linear  Thought Content:  Normal  Suicidal:  None  Homicidal:  None  Hallucinations:  None  Delusion:  None  Memory:  Intact  Orientation:  Person, Place, Time, and Situation  Reliability:  " good  Insight:  Good  Judgement:  Good  Impulse Control:  Fair  Physical/Medical Issues:   see problem list      Assessment / Plan      Visit Diagnosis/Orders Placed This Visit:  Diagnoses and all orders for this visit:    1. ADHD (attention deficit hyperactivity disorder), combined type (Primary)  -     buPROPion XL (Wellbutrin XL) 150 MG 24 hr tablet; Take 1 tablet by mouth Every Morning.  Dispense: 30 tablet; Refill: 1    2. Anxiety    3. Major depressive disorder, recurrent episode, mild  -     buPROPion XL (Wellbutrin XL) 150 MG 24 hr tablet; Take 1 tablet by mouth Every Morning.  Dispense: 30 tablet; Refill: 1         Functional Status: No impairment    Prognosis: Good with Ongoing Treatment     Impression/Formulation:  Patient appeared alert and oriented.  Patient is voluntarily requesting to continue outpatient psychiatric treatment at [facility].  Patient is receptive to assistance with maintaining a stable lifestyle.  Patient presents with history of     ICD-10-CM ICD-9-CM   1. ADHD (attention deficit hyperactivity disorder), combined type  F90.2 314.01   2. Anxiety  F41.9 300.00   3. Major depressive disorder, recurrent episode, mild  F33.0 296.31     Reviewed patient's previous provider notes. Reviewed most recent labs. Patient meets DSM V diagnostic criteria for diagnoses. Diagnoses may be updated as more information becomes available.       Treatment Plan:   Continue bupropion xl 150 mg daily  Follow up in 6 weeks or sooner if needed    Patient will continue supportive psychotherapy efforts and medications as indicated. Clinic will obtain release of information for current treatment team for continuity of care as needed. Patient will contact this office, call 911 or present to the nearest emergency room should suicidal or homicidal ideations occur.  Discussed medication options and treatment plan of prescribed medication(s) as well as the risks, benefits, and potential side  effects. Patient ackowledged and verbally consented to continue with current treatment plan and was educated on the importance of compliance with treatment and follow-up appointments.     Patient instructions:  Instructed will take 4-6 weeks for full therapeutic effect. Medication risks and side effects discussed with patient including risk for worsening mood, changes in behavior, thoughts of suicide or homicide, induction of isiah, serotonin syndrome.   If any thoughts of SI or HI, worsening mood or changes in behavior, call 911 or crisis line [phone], or go to nearest ER at once. Pt.verbalizes understanding and consents to treatment with this medication.     Follow Up:   Return in about 6 weeks (around 2/14/2024) for Med Check.        JARED Oliveros, PMHNP-BC  [facility]

## 2024-02-03 ENCOUNTER — PREP FOR SURGERY (OUTPATIENT)
Dept: OTHER | Facility: HOSPITAL | Age: 34
End: 2024-02-03
Payer: COMMERCIAL

## 2024-02-03 NOTE — H&P
Zi Laboy  : 1990  MRN: [id number]  CSN: [id number]    History and Physical    Subjective   Zi Laboy is a 33 y.o. year old  who present for diagnostic hysteroscopy with anticipated Myosure due to menorrhagia with suspected type 0 submucosal fibroid by saline infusion sonogram.    Past Medical History:   Diagnosis Date    Anxiety 2017     Past Surgical History:   Procedure Laterality Date    WISDOM TOOTH EXTRACTION      TONSILLECTOMY  2008     OB History    Para Term  AB Living   0 0 0 0 0 0   SAB IAB Ectopic Molar Multiple Live Births   0 0 0 0 0 0     Social History    Tobacco Use      Smoking status: Every Day        Packs/day: .6        Types: Cigarettes        Start date:       Smokeless tobacco: Never      Tobacco comments: When stressed i smoke more      Current Outpatient Medications:     buPROPion XL (Wellbutrin XL) 150 MG 24 hr tablet, Take 1 tablet by mouth Every Morning., Disp: 30 tablet, Rfl: 1    metaxalone (SKELAXIN) 800 MG tablet, Take 1 tablet by mouth 3 (Three) Times a Day As Needed for Muscle Spasms., Disp: 15 tablet, Rfl: 0    naproxen (NAPROSYN) 500 MG tablet, Take 1 tablet by mouth 2 (Two) Times a Day With Meals., Disp: 14 tablet, Rfl: 0    Semaglutide-Weight Management 0.5 MG/0.5ML solution auto-injector, Inject 0.5 mL under the skin into the appropriate area as directed Every 7 (Seven) Days. ., Disp: 2 mL, Rfl: 3    vitamin B-12 (CYANOCOBALAMIN) 1000 MCG tablet, Take 1 tablet by mouth Daily., Disp: , Rfl:     vitamin D (ERGOCALCIFEROL) 1.25 MG (86673 UT) capsule capsule, Take 1 capsule by mouth 1 (One) Time Per Week., Disp: 12 capsule, Rfl: 1    No Known Allergies    Review of Systems   Constitutional:  Negative for chills, fever and unexpected weight change.   HENT:  Negative for ear pain, facial swelling, sinus pressure, sneezing and sore throat.    Respiratory:  Negative for cough, shortness of breath and wheezing.    Cardiovascular:   Negative for chest pain and palpitations.   Hematological:  Does not bruise/bleed easily.         Objective     Vital Signs: See nursing documentation   General: well developed; well nourished  no acute distress   Mental status: Alert and oriented   Heart: Not performed.   Lungs: breathing is unlabored   Abdomen: soft, non-tender; no masses  no umbilical or inguinal hernias are present  no hepato-splenomegaly   Pelvis: Not performed.        Assessment   Menorrhagia with suspected type 0 submucosal fibroid     Plan   Diagnostic hysteroscopy Myosure resection of intracavitary pathology  I have previously discussed with Zi the risks of her hysteroscopy with possible Myosure resection of any intracavitary pathology. Risks discussed included intraoperative bleeding, infection at the site of surgery and damage to the adjacent surrounding organs. Additionally, I explained the small risk for reoperation in the event of unanticipated bleeding or surgical injury.  Finally the risks of cerebral and/or pulmonary edema related to excess absorption of the distending fluid & the small chance the procedure could not be completed if there was an excessive mismatch of fluid infused vs. fluid recovered were explained.        Eliceo Griffith MD       (Pt's PCP is Nancy Melgar MD)

## 2024-02-05 ENCOUNTER — OUTSIDE FACILITY SERVICE (OUTPATIENT)
Dept: OBSTETRICS AND GYNECOLOGY | Facility: CLINIC | Age: 34
End: 2024-02-05
Payer: COMMERCIAL

## 2024-02-05 DIAGNOSIS — Z98.890 POST-OPERATIVE STATE: Primary | ICD-10-CM

## 2024-02-05 RX ORDER — HYDROCODONE BITARTRATE AND ACETAMINOPHEN 5; 325 MG/1; MG/1
1-2 TABLET ORAL EVERY 4 HOURS PRN
Qty: 12 TABLET | Refills: 0 | Status: SHIPPED | OUTPATIENT
Start: 2024-02-05 | End: 2024-02-10

## 2024-02-06 ENCOUNTER — LAB REQUISITION (OUTPATIENT)
Dept: LAB | Facility: HOSPITAL | Age: 34
End: 2024-02-06
Payer: COMMERCIAL

## 2024-02-06 DIAGNOSIS — N92.0 EXCESSIVE AND FREQUENT MENSTRUATION WITH REGULAR CYCLE: ICD-10-CM

## 2024-02-06 DIAGNOSIS — D25.0 SUBMUCOUS LEIOMYOMA OF UTERUS: ICD-10-CM

## 2024-02-06 PROCEDURE — 88305 TISSUE EXAM BY PATHOLOGIST: CPT | Performed by: OBSTETRICS & GYNECOLOGY

## 2024-02-07 LAB
CYTO UR: NORMAL
LAB AP CASE REPORT: NORMAL
LAB AP CLINICAL INFORMATION: NORMAL
PATH REPORT.FINAL DX SPEC: NORMAL
PATH REPORT.GROSS SPEC: NORMAL

## 2024-02-12 ENCOUNTER — OFFICE VISIT (OUTPATIENT)
Dept: BEHAVIORAL HEALTH | Facility: CLINIC | Age: 34
End: 2024-02-12
Payer: COMMERCIAL

## 2024-02-12 VITALS
SYSTOLIC BLOOD PRESSURE: 124 MMHG | OXYGEN SATURATION: 100 % | BODY MASS INDEX: 51.38 KG/M2 | DIASTOLIC BLOOD PRESSURE: 72 MMHG | HEART RATE: 100 BPM | WEIGHT: 290 LBS | HEIGHT: 63 IN

## 2024-02-12 DIAGNOSIS — F41.9 ANXIETY: ICD-10-CM

## 2024-02-12 DIAGNOSIS — F33.0 MAJOR DEPRESSIVE DISORDER, RECURRENT EPISODE, MILD: Primary | ICD-10-CM

## 2024-02-12 DIAGNOSIS — F90.2 ADHD (ATTENTION DEFICIT HYPERACTIVITY DISORDER), COMBINED TYPE: ICD-10-CM

## 2024-02-12 PROCEDURE — 99214 OFFICE O/P EST MOD 30 MIN: CPT | Performed by: REGISTERED NURSE

## 2024-02-12 RX ORDER — DEXTROAMPHETAMINE SACCHARATE, AMPHETAMINE ASPARTATE, DEXTROAMPHETAMINE SULFATE AND AMPHETAMINE SULFATE 2.5; 2.5; 2.5; 2.5 MG/1; MG/1; MG/1; MG/1
10 TABLET ORAL DAILY
Qty: 30 TABLET | Refills: 0 | Status: SHIPPED | OUTPATIENT
Start: 2024-02-12

## 2024-02-17 NOTE — PROGRESS NOTES
Subjective   Chief Complaint   Patient presents with    Post-op     Zi RUIZ Laboy is a 33 y.o. year old  presenting to be seen for her post-operative visit.  Currently she reports no problems with eating, bowel movements, voiding, or wound drainage and pain is well controlled.    The pathology results from her procedure are in Zi's record and are benign.        The following portions of the patient's history were reviewed and updated as appropriate:current medications and allergies       Objective   /78   Resp 14   Wt 132 kg (292 lb)   LMP 2024 (Approximate)   BMI 52.56 kg/m²     General:  well developed; well nourished  no acute distress   Abdomen: soft, non-tender; no masses  no umbilical or inguinal hernias are present  no hepato-splenomegaly   Pelvis: Clinical staff was present for exam  External genitalia:  normal appearance of the external genitalia including Bartholin's and [place]'s glands.  :  urethral meatus normal;  Vaginal:  normal pink mucosa without prolapse or lesions.  Cervix:  normal appearance.  Uterus:  normal size, shape and consistency.  Adnexa:  normal bimanual exam of the adnexa.  Rectal:  digital rectal exam not performed; anus visually normal appearing.          Assessment   S/P hysteroscopy with Myosure     Plan   May return to full activity with no restrictions  The importance of keeping all planned follow-up and taking all medications as prescribed was emphasized.  Follow up PRN .           This note was electronically signed.    Eliceo Griffith M.D.  2024    Part of this note may be an electronic transcription/translation of spoken language to printed text using the Dragon Dictation System.

## 2024-02-19 ENCOUNTER — OFFICE VISIT (OUTPATIENT)
Dept: OBSTETRICS AND GYNECOLOGY | Facility: CLINIC | Age: 34
End: 2024-02-19
Payer: COMMERCIAL

## 2024-02-19 VITALS
WEIGHT: 292 LBS | SYSTOLIC BLOOD PRESSURE: 124 MMHG | BODY MASS INDEX: 52.56 KG/M2 | RESPIRATION RATE: 14 BRPM | DIASTOLIC BLOOD PRESSURE: 78 MMHG

## 2024-02-19 DIAGNOSIS — Z98.890 POST-OPERATIVE STATE: Primary | ICD-10-CM

## 2024-02-19 PROBLEM — D25.0 FIBROIDS, SUBMUCOSAL: Status: RESOLVED | Noted: 2023-11-02 | Resolved: 2024-02-19

## 2024-02-26 ENCOUNTER — OFFICE VISIT (OUTPATIENT)
Dept: BEHAVIORAL HEALTH | Facility: CLINIC | Age: 34
End: 2024-02-26
Payer: COMMERCIAL

## 2024-02-26 VITALS
BODY MASS INDEX: 51.74 KG/M2 | WEIGHT: 292 LBS | HEART RATE: 108 BPM | HEIGHT: 63 IN | SYSTOLIC BLOOD PRESSURE: 118 MMHG | DIASTOLIC BLOOD PRESSURE: 74 MMHG | OXYGEN SATURATION: 100 %

## 2024-02-26 DIAGNOSIS — F33.0 MAJOR DEPRESSIVE DISORDER, RECURRENT EPISODE, MILD: ICD-10-CM

## 2024-02-26 DIAGNOSIS — F90.2 ADHD (ATTENTION DEFICIT HYPERACTIVITY DISORDER), COMBINED TYPE: ICD-10-CM

## 2024-02-26 DIAGNOSIS — F41.9 ANXIETY: Primary | ICD-10-CM

## 2024-02-26 PROCEDURE — 99214 OFFICE O/P EST MOD 30 MIN: CPT | Performed by: REGISTERED NURSE

## 2024-02-26 RX ORDER — DEXTROAMPHETAMINE SACCHARATE, AMPHETAMINE ASPARTATE, DEXTROAMPHETAMINE SULFATE AND AMPHETAMINE SULFATE 2.5; 2.5; 2.5; 2.5 MG/1; MG/1; MG/1; MG/1
10 TABLET ORAL 2 TIMES DAILY
Qty: 60 TABLET | Refills: 0 | Status: SHIPPED | OUTPATIENT
Start: 2024-02-26

## 2024-02-26 RX ORDER — BUPROPION HYDROCHLORIDE 150 MG/1
150 TABLET ORAL EVERY MORNING
Qty: 30 TABLET | Refills: 1 | Status: SHIPPED | OUTPATIENT
Start: 2024-02-26

## 2024-02-26 NOTE — PROGRESS NOTES
Follow Up Office Visit      Patient Name: Zi Laboy  : 1990   MRN: [id number]     Referring Provider: Nancy Melgar MD    Chief Complaint:      ICD-10-CM ICD-9-CM   1. Anxiety  F41.9 300.00   2. ADHD (attention deficit hyperactivity disorder), combined type  F90.2 314.01   3. Major depressive disorder, recurrent episode, mild  F33.0 296.31        History of Present Illness:   Zi Laboy is a 33 y.o. female who is here today for follow up and medication management    Subjective      Patient Reports: That she feels like she has been more productive and motivated especially in the mornings. She reports that she doesn't feel like the medication lasts the whole day. She reports that she forgot to take it one day and took it at 630 or 7 at night and it did not impact her sleep. Denies side effects. Denies SI/HI/AVH     Review of Systems:   Review of Systems   Constitutional:  Negative for appetite change and unexpected weight change.   Eyes:  Negative for visual disturbance.   Respiratory:  Negative for chest tightness and shortness of breath.    Cardiovascular:  Negative for chest pain.   Musculoskeletal:  Negative for gait problem.   Skin:  Negative for rash and wound.   Neurological:  Negative for dizziness, tremors, seizures, weakness and light-headedness.   Psychiatric/Behavioral:  Positive for decreased concentration. Negative for agitation, behavioral problems, confusion, dysphoric mood, hallucinations, self-injury and suicidal ideas. The patient is not nervous/anxious and is not hyperactive.      Sleep pattern: no changes, has felt more awake during the day  Appetite: decreased appetite     Screening Scores:   PHQ-9 : 13  CORTNEY-7 : 16    RISK ASSESSMENT:  Patient denies any thoughts or intent of suicide today. Patient denies any impulsive behavior today.     Medications:     Current Outpatient Medications:     amphetamine-dextroamphetamine (Adderall) 10 MG tablet, Take 1 tablet by mouth  "Daily., Disp: 30 tablet, Rfl: 0    buPROPion XL (Wellbutrin XL) 150 MG 24 hr tablet, Take 1 tablet by mouth Every Morning., Disp: 30 tablet, Rfl: 1    metaxalone (SKELAXIN) 800 MG tablet, Take 1 tablet by mouth 3 (Three) Times a Day As Needed for Muscle Spasms., Disp: 15 tablet, Rfl: 0    naproxen (NAPROSYN) 500 MG tablet, Take 1 tablet by mouth 2 (Two) Times a Day With Meals., Disp: 14 tablet, Rfl: 0    Semaglutide-Weight Management 0.5 MG/0.5ML solution auto-injector, Inject 0.5 mL under the skin into the appropriate area as directed Every 7 (Seven) Days. ., Disp: 2 mL, Rfl: 3    vitamin B-12 (CYANOCOBALAMIN) 1000 MCG tablet, Take 1 tablet by mouth Daily., Disp: , Rfl:     vitamin D (ERGOCALCIFEROL) 1.25 MG (54135 UT) capsule capsule, Take 1 capsule by mouth 1 (One) Time Per Week., Disp: 12 capsule, Rfl: 1    Medication Considerations:  KADEN reviewed and appropriate.      Allergies:   No Known Allergies      Objective     Physical Exam:  Vital Signs:   Vitals:    02/26/24 1204   Weight: 132 kg (292 lb)   Height: 158.8 cm (62.5\")     Body mass index is 52.56 kg/m².     Mental Status Exam:   Hygiene:   good  Cooperation:  Cooperative  Eye Contact:  Good  Psychomotor Behavior:  Appropriate  Affect:  Appropriate  Mood: normal  Speech:  Normal  Thought Process:  Goal directed and Linear  Thought Content:  Normal  Suicidal:  None  Homicidal:  None  Hallucinations:  None  Delusion:  None  Memory:  Intact  Orientation:  Person, Place, Time, and Situation  Reliability:  good  Insight:  Good  Judgement:  Good  Impulse Control:  Good  Physical/Medical Issues:   see problem list      Assessment / Plan      Visit Diagnosis/Orders Placed This Visit:  Diagnoses and all orders for this visit:    1. Anxiety (Primary)    2. ADHD (attention deficit hyperactivity disorder), combined type    3. Major depressive disorder, recurrent episode, mild         Functional Status: No impairment    Prognosis: Good with Ongoing Treatment "     Impression/Formulation:  Patient appeared alert and oriented.  Patient is voluntarily requesting to continue outpatient psychiatric treatment at [facility].  Patient is receptive to assistance with maintaining a stable lifestyle.  Patient presents with history of     ICD-10-CM ICD-9-CM   1. Anxiety  F41.9 300.00   2. ADHD (attention deficit hyperactivity disorder), combined type  F90.2 314.01   3. Major depressive disorder, recurrent episode, mild  F33.0 296.31     Reviewed patient's previous provider notes. Reviewed most recent labs. Patient meets DSM V diagnostic criteria for diagnoses. Diagnoses may be updated as more information becomes available.       Treatment Plan:   Continue adderall 10 mg in am and add 10 mg around 1pm  Continue bupropion xl 150 mg daily  Follow up in 1 month or sooner if needed    Patient will continue supportive psychotherapy efforts and medications as indicated. Clinic will obtain release of information for current treatment team for continuity of care as needed. Patient will contact this office, call 911 or present to the nearest emergency room should suicidal or homicidal ideations occur.  Discussed medication options and treatment plan of prescribed medication(s) as well as the risks, benefits, and potential side effects. Patient ackowledged and verbally consented to continue with current treatment plan and was educated on the importance of compliance with treatment and follow-up appointments.     Patient instructions:  Reviewed patient's KADEN. Discussed the risks including side effects, benefits and alternatives of stimulant medication use. We talked about the fact that these medications are schedule II controlled substances as well as the risks for potential addiction. We also discussed how this medication will not be refilled early and the medication should not be shared or taken in any manner outside of as prescribed. We discussed the potential for  decreased appetite, weight loss and cardiac effects as well. Also, advised patient to watch for heart racing, palpitations, chest pain, high blood pressure, increased irritability or agitation related to stimulant use.     Follow Up:   Return in about 1 month (around 3/26/2024) for Med Check.        JARED Oliveros, PMHNP-BC [facility]

## 2024-03-25 ENCOUNTER — OFFICE VISIT (OUTPATIENT)
Dept: BEHAVIORAL HEALTH | Facility: CLINIC | Age: 34
End: 2024-03-25
Payer: COMMERCIAL

## 2024-03-25 VITALS
BODY MASS INDEX: 51.91 KG/M2 | HEIGHT: 63 IN | OXYGEN SATURATION: 100 % | WEIGHT: 293 LBS | SYSTOLIC BLOOD PRESSURE: 132 MMHG | HEART RATE: 95 BPM | DIASTOLIC BLOOD PRESSURE: 74 MMHG

## 2024-03-25 DIAGNOSIS — Z79.899 MEDICATION MANAGEMENT: ICD-10-CM

## 2024-03-25 DIAGNOSIS — F33.0 MAJOR DEPRESSIVE DISORDER, RECURRENT EPISODE, MILD: ICD-10-CM

## 2024-03-25 DIAGNOSIS — F90.2 ADHD (ATTENTION DEFICIT HYPERACTIVITY DISORDER), COMBINED TYPE: Primary | ICD-10-CM

## 2024-03-25 DIAGNOSIS — F41.9 ANXIETY: ICD-10-CM

## 2024-03-25 RX ORDER — BUPROPION HYDROCHLORIDE 150 MG/1
150 TABLET ORAL EVERY MORNING
Qty: 30 TABLET | Refills: 2 | Status: SHIPPED | OUTPATIENT
Start: 2024-03-25

## 2024-03-25 RX ORDER — DEXTROAMPHETAMINE SACCHARATE, AMPHETAMINE ASPARTATE, DEXTROAMPHETAMINE SULFATE AND AMPHETAMINE SULFATE 2.5; 2.5; 2.5; 2.5 MG/1; MG/1; MG/1; MG/1
10 TABLET ORAL 2 TIMES DAILY
Qty: 60 TABLET | Refills: 0 | Status: SHIPPED | OUTPATIENT
Start: 2024-03-25

## 2024-03-25 NOTE — PROGRESS NOTES
Follow Up Office Visit      Patient Name: Zi Laboy  : 1990   MRN: [id number]     Referring Provider: Nancy Melgar MD    Chief Complaint:      ICD-10-CM ICD-9-CM   1. ADHD (attention deficit hyperactivity disorder), combined type  F90.2 314.01   2. Major depressive disorder, recurrent episode, mild  F33.0 296.31   3. Medication management  Z79.899 V58.69   4. Anxiety  F41.9 300.00        History of Present Illness:   Zi Laboy is a 33 y.o. female who is here today for follow up and medication management    Subjective      Patient Reports: that taking the adderall in the morning and afternoon lasts throughout her work shift. 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 She reports that she only takes the medication while working. She reports that she has not been irritable or agitated. Denies side effects. Denies SI/HI/AVH.     Review of Systems:   Review of Systems   Constitutional:  Negative for appetite change and unexpected weight change.   Eyes:  Negative for visual disturbance.   Respiratory:  Negative for chest tightness and shortness of breath.    Cardiovascular:  Negative for chest pain.   Musculoskeletal:  Negative for gait problem.   Skin:  Negative for rash and wound.   Neurological:  Negative for dizziness, tremors, seizures, weakness and light-headedness.   Psychiatric/Behavioral:  Negative for agitation, behavioral problems, confusion, dysphoric mood, hallucinations, self-injury and suicidal ideas. The patient is not nervous/anxious and is not hyperactive.      Sleep pattern: no changes with sleep  Appetite: maybe a little decreased     Screening Scores:   PHQ-9 : 13  CORTNEY-7 : 14    Patient's scores indicative of higher levels of depression and anxiety than what patient reports in interview. Patient reports that the medications are working well.      RISK ASSESSMENT:  Patient denies any  "thoughts or intent of suicide today. Patient denies any impulsive behavior today.     Medications:     Current Outpatient Medications:     amphetamine-dextroamphetamine (Adderall) 10 MG tablet, Take 1 tablet by mouth 2 (Two) Times a Day., Disp: 60 tablet, Rfl: 0    buPROPion XL (Wellbutrin XL) 150 MG 24 hr tablet, Take 1 tablet by mouth Every Morning., Disp: 30 tablet, Rfl: 2    metaxalone (SKELAXIN) 800 MG tablet, Take 1 tablet by mouth 3 (Three) Times a Day As Needed for Muscle Spasms., Disp: 15 tablet, Rfl: 0    naproxen (NAPROSYN) 500 MG tablet, Take 1 tablet by mouth 2 (Two) Times a Day With Meals., Disp: 14 tablet, Rfl: 0    Semaglutide-Weight Management 0.5 MG/0.5ML solution auto-injector, Inject 0.5 mL under the skin into the appropriate area as directed Every 7 (Seven) Days. ., Disp: 2 mL, Rfl: 3    vitamin B-12 (CYANOCOBALAMIN) 1000 MCG tablet, Take 1 tablet by mouth Daily., Disp: , Rfl:     vitamin D (ERGOCALCIFEROL) 1.25 MG (34917 UT) capsule capsule, Take 1 capsule by mouth 1 (One) Time Per Week., Disp: 12 capsule, Rfl: 1    Medication Considerations:  KADEN reviewed and appropriate.      Allergies:   No Known Allergies      Objective     Physical Exam:  Vital Signs:   Vitals:    03/25/24 1240 03/25/24 1246   BP:  132/74   Pulse:  95   SpO2:  100%   Weight: 133 kg (294 lb)    Height: 158.8 cm (62.5\")      Body mass index is 52.92 kg/m².     Mental Status Exam:   Hygiene:   good  Cooperation:  Cooperative  Eye Contact:  Good  Psychomotor Behavior:  Appropriate  Affect:  Appropriate  Mood: normal  Speech:  Normal  Thought Process:  Goal directed and Linear  Thought Content:  Normal  Suicidal:  None  Homicidal:  None  Hallucinations:  None  Delusion:  None  Memory:  Intact  Orientation:  Person, Place, Time, and Situation  Reliability:  good  Insight:  Good  Judgement:  Good  Impulse Control:  Good  Physical/Medical Issues:   see problem list      Assessment / Plan      Visit Diagnosis/Orders Placed This " Visit:  Diagnoses and all orders for this visit:    1. ADHD (attention deficit hyperactivity disorder), combined type (Primary)  -     Compliance Drug Analysis, Ur - Urine, Clean Catch; Future  -     Compliance Drug Analysis, Ur - Urine, Clean Catch  -     amphetamine-dextroamphetamine (Adderall) 10 MG tablet; Take 1 tablet by mouth 2 (Two) Times a Day.  Dispense: 60 tablet; Refill: 0    2. Major depressive disorder, recurrent episode, mild  -     buPROPion XL (Wellbutrin XL) 150 MG 24 hr tablet; Take 1 tablet by mouth Every Morning.  Dispense: 30 tablet; Refill: 2    3. Medication management  -     Compliance Drug Analysis, Ur - Urine, Clean Catch; Future  -     Compliance Drug Analysis, Ur - Urine, Clean Catch    4. Anxiety  -     buPROPion XL (Wellbutrin XL) 150 MG 24 hr tablet; Take 1 tablet by mouth Every Morning.  Dispense: 30 tablet; Refill: 2         Functional Status: No impairment    Prognosis: Good with Ongoing Treatment     Impression/Formulation:  Patient appeared alert and oriented.  Patient is voluntarily requesting to continue outpatient psychiatric treatment at [facility].  Patient is receptive to assistance with maintaining a stable lifestyle.  Patient presents with history of     ICD-10-CM ICD-9-CM   1. ADHD (attention deficit hyperactivity disorder), combined type  F90.2 314.01   2. Major depressive disorder, recurrent episode, mild  F33.0 296.31   3. Medication management  Z79.899 V58.69   4. Anxiety  F41.9 300.00     Reviewed patient's previous provider notes. Reviewed most recent labs. Patient meets DSM V diagnostic criteria for diagnoses. Diagnoses may be updated as more information becomes available.       Treatment Plan:   Continue adderall 10 mg twice a day  Continue bupropion xl 150 mg daily  Collect UDS today  Follow up in 3 months or sooner if needed  Patient will continue supportive psychotherapy efforts and medications as indicated. Clinic will obtain release  of information for current treatment team for continuity of care as needed. Patient will contact this office, call 911 or present to the nearest emergency room should suicidal or homicidal ideations occur.  Discussed medication options and treatment plan of prescribed medication(s) as well as the risks, benefits, and potential side effects. Patient ackowledged and verbally consented to continue with current treatment plan and was educated on the importance of compliance with treatment and follow-up appointments.     Patient instructions:  Medication risks and side effects discussed with patient including risk for worsening mood, changes in behavior, thoughts of suicide or homicide, induction of isiah, serotonin syndrome.   If any thoughts of SI or HI, worsening mood or changes in behavior, call 911 or crisis line [phone], or go to nearest ER at once.Pt.verbalizes understanding and consents to treatment with this medication.   Reviewed patient's KADEN. Discussed the risks including side effects, benefits and alternatives of stimulant medication use. We talked about the fact that these medications are schedule II controlled substances as well as the risks for potential addiction. We also discussed how this medication will not be refilled early and the medication should not be shared or taken in any manner outside of as prescribed. We discussed the potential for decreased appetite, weight loss and cardiac effects as well. Also, advised patient to watch for heart racing, palpitations, chest pain, high blood pressure, increased irritability or agitation related to stimulant use.     Follow Up:   Return in about 3 months (around 6/25/2024) for Med Check.        JARED Oliveros, [facility]-BC [facility]

## 2024-04-02 LAB — DRUGS UR: NORMAL

## 2024-06-25 ENCOUNTER — OFFICE VISIT (OUTPATIENT)
Dept: BEHAVIORAL HEALTH | Facility: CLINIC | Age: 34
End: 2024-06-25
Payer: COMMERCIAL

## 2024-06-25 VITALS
HEIGHT: 63 IN | DIASTOLIC BLOOD PRESSURE: 90 MMHG | HEART RATE: 115 BPM | OXYGEN SATURATION: 98 % | WEIGHT: 293 LBS | SYSTOLIC BLOOD PRESSURE: 123 MMHG | BODY MASS INDEX: 51.91 KG/M2

## 2024-06-25 DIAGNOSIS — F33.0 MAJOR DEPRESSIVE DISORDER, RECURRENT EPISODE, MILD: Primary | ICD-10-CM

## 2024-06-25 DIAGNOSIS — F41.9 ANXIETY: ICD-10-CM

## 2024-06-25 DIAGNOSIS — F90.2 ADHD (ATTENTION DEFICIT HYPERACTIVITY DISORDER), COMBINED TYPE: ICD-10-CM

## 2024-06-25 DIAGNOSIS — E66.01 MORBID (SEVERE) OBESITY DUE TO EXCESS CALORIES: ICD-10-CM

## 2024-06-25 PROCEDURE — 99214 OFFICE O/P EST MOD 30 MIN: CPT | Performed by: REGISTERED NURSE

## 2024-06-25 RX ORDER — DEXTROAMPHETAMINE SACCHARATE, AMPHETAMINE ASPARTATE, DEXTROAMPHETAMINE SULFATE AND AMPHETAMINE SULFATE 2.5; 2.5; 2.5; 2.5 MG/1; MG/1; MG/1; MG/1
10 TABLET ORAL 2 TIMES DAILY
Qty: 60 TABLET | Refills: 0 | Status: SHIPPED | OUTPATIENT
Start: 2024-06-25

## 2024-06-25 NOTE — PROGRESS NOTES
Follow Up Office Visit      Patient Name: Zi Laboy  : 1990   MRN: [id number]     Referring Provider: Nancy Melgar MD    Chief Complaint:      ICD-10-CM ICD-9-CM   1. Major depressive disorder, recurrent episode, mild  F33.0 296.31   2. ADHD (attention deficit hyperactivity disorder), combined type  F90.2 314.01   3. Anxiety  F41.9 300.00        History of Present Illness:   Zi Laboy is a 33 y.o. female who is here today for follow up and medication management    Subjective      Patient Reports: that she has been stressed about life. She reports that the credit card company took her to court and they are garnishing her check to pay for an old CLARED card balance. She reports that she got a new position and was salary for a while, but has gone back to hourly and is picking up more hours. She reports that she likes her job. She reports that she is first shift now and takes her adderall mostly 1 time a day, at 8 am, but is no longer taking her bupropion. She reports that she feels like the adderall works well for her, and if if weren't for her added financial stress she would be fine. She reports that she feels like her mood is good. She reports that she is in therapy 1 time every 2 weeks. Denies side effects. Denies SI/HI/AVH.    Review of Systems:   Review of Systems   Constitutional:  Negative for appetite change and unexpected weight change.   Eyes:  Negative for visual disturbance.   Respiratory:  Negative for chest tightness and shortness of breath.    Cardiovascular:  Negative for chest pain.   Musculoskeletal:  Negative for gait problem.   Skin:  Negative for rash and wound.   Neurological:  Negative for dizziness, tremors, seizures, weakness and light-headedness.   Psychiatric/Behavioral:  Negative for agitation, behavioral problems, confusion, dysphoric mood, hallucinations, self-injury and suicidal ideas. The patient is nervous/anxious. The patient is not hyperactive.      Sleep  pattern: sleep schedule different due to job, can stay asleep, gets about 6 hours, not tired tired during the day  Appetite: stress eating.     PHQ-9 Depression Screening  Little interest or pleasure in doing things? 3-->nearly every day   Feeling down, depressed, or hopeless? 2-->more than half the days   Trouble falling or staying asleep, or sleeping too much? 3-->nearly every day   Feeling tired or having little energy? 3-->nearly every day   Poor appetite or overeating? 3-->nearly every day   Feeling bad about yourself - or that you are a failure or have let yourself or your family down? 0-->not at all   Trouble concentrating on things, such as reading the newspaper or watching television? 3-->nearly every day   Moving or speaking so slowly that other people could have noticed? Or the opposite - being so fidgety or restless that you have been moving around a lot more than usual? 3-->nearly every day   Thoughts that you would be better off dead, or of hurting yourself in some way? 0-->not at all   PHQ-9 Total Score 20   If you checked off any problems, how difficult have these problems made it for you to do your work, take care of things at home, or get along with other people? somewhat difficult     CORTNEY-7 Anxiety Screening  Over the last two weeks, how often have you been bothered by the following problems?  Feeling nervous, anxious or on edge: Nearly every day  Not being able to stop or control worrying: Nearly every day  Worrying too much about different things: Nearly every day  Trouble Relaxing: Nearly every day  Being so restless that it is hard to sit still: Nearly every day  Becoming easily annoyed or irritable: Nearly every day  Feeling afraid as if something awful might happen: Nearly every day  CORTNEY 7 Total Score: 21  If you checked any problems, how difficult have these problems made it for you to do your work, take care of things at home, or get along with other people: Somewhat difficult    RISK  "ASSESSMENT:  Patient denies any thoughts or intent of suicide today. Patient denies any impulsive behavior today.     Medications:     Current Outpatient Medications:     amphetamine-dextroamphetamine (Adderall) 10 MG tablet, Take 1 tablet by mouth 2 (Two) Times a Day., Disp: 60 tablet, Rfl: 0    metaxalone (SKELAXIN) 800 MG tablet, Take 1 tablet by mouth 3 (Three) Times a Day As Needed for Muscle Spasms., Disp: 15 tablet, Rfl: 0    naproxen (NAPROSYN) 500 MG tablet, Take 1 tablet by mouth 2 (Two) Times a Day With Meals., Disp: 14 tablet, Rfl: 0    Semaglutide-Weight Management 0.5 MG/0.5ML solution auto-injector, Inject 0.5 mL under the skin into the appropriate area as directed Every 7 (Seven) Days. ., Disp: 2 mL, Rfl: 3    vitamin B-12 (CYANOCOBALAMIN) 1000 MCG tablet, Take 1 tablet by mouth Daily., Disp: , Rfl:     vitamin D (ERGOCALCIFEROL) 1.25 MG (02954 UT) capsule capsule, Take 1 capsule by mouth 1 (One) Time Per Week., Disp: 12 capsule, Rfl: 1    Medication Considerations:  KADEN reviewed and appropriate.      Allergies:   No Known Allergies      Objective     Physical Exam:  Vital Signs:   Vitals:    06/25/24 1235   BP: 123/90   Pulse: 115   SpO2: 98%   Weight: (!) 137 kg (302 lb 3.2 oz)   Height: 158.8 cm (62.5\")     Body mass index is 54.39 kg/m².     Mental Status Exam:   Hygiene:   good  Cooperation:  Cooperative  Eye Contact:  Good  Psychomotor Behavior:  Appropriate  Affect:  Appropriate  Mood: anxious  Speech:  Normal  Thought Process:  Goal directed and Linear  Thought Content:  Normal  Suicidal:  None  Homicidal:  None  Hallucinations:  None  Delusion:  None  Memory:  Intact  Orientation:  Person, Place, Time, and Situation  Reliability:  good  Insight:  Good  Judgement:  Good  Impulse Control:  Good  Physical/Medical Issues:   see problem list      Assessment / Plan      Visit Diagnosis/Orders Placed This Visit:  Diagnoses and all orders for this visit:    1. Major depressive disorder, recurrent " episode, mild (Primary)    2. ADHD (attention deficit hyperactivity disorder), combined type  -     amphetamine-dextroamphetamine (Adderall) 10 MG tablet; Take 1 tablet by mouth 2 (Two) Times a Day.  Dispense: 60 tablet; Refill: 0    3. Anxiety         Functional Status: No impairment    Prognosis: Good with Ongoing Treatment     Impression/Formulation:  Patient appeared alert and oriented.  Patient is voluntarily requesting to continue outpatient psychiatric treatment at [facility].  Patient is receptive to assistance with maintaining a stable lifestyle.  Patient presents with history of     ICD-10-CM ICD-9-CM   1. Major depressive disorder, recurrent episode, mild  F33.0 296.31   2. ADHD (attention deficit hyperactivity disorder), combined type  F90.2 314.01   3. Anxiety  F41.9 300.00     Reviewed patient's previous provider notes. Reviewed most recent labs. Patient meets DSM V diagnostic criteria for diagnoses. Diagnoses may be updated as more information becomes available.       Treatment Plan:   Continue adderall 10 mg 1-2 times per day  Discontinue bupropion  Continue individual therapy  Follow up in 3 months or sooner if needed  Patient will continue supportive psychotherapy efforts and medications as indicated. Clinic will obtain release of information for current treatment team for continuity of care as needed. Patient will contact this office, call 911 or present to the nearest emergency room should suicidal or homicidal ideations occur.  Discussed medication options and treatment plan of prescribed medication(s) as well as the risks, benefits, and potential side effects. Patient acknowledged and verbally consented to continue with current treatment plan and was educated on the importance of compliance with treatment and follow-up appointments.     Patient instructions:  Reviewed patient's KADEN. Discussed the risks including side effects, benefits and alternatives of stimulant  medication use. We talked about the fact that these medications are schedule II controlled substances as well as the risks for potential addiction. We also discussed how this medication will not be refilled early and the medication should not be shared or taken in any manner outside of as prescribed. We discussed the potential for decreased appetite, weight loss and cardiac effects as well. Also, advised patient to watch for heart racing, palpitations, chest pain, high blood pressure, increased irritability or agitation related to stimulant use.     Follow Up:   Return in about 3 months (around 9/25/2024) for Med Check.        JARED Oliveros, PMHNP-BC [facility]

## 2024-06-25 NOTE — TELEPHONE ENCOUNTER
Caller: LaboyZi    Relationship: Self    Best call back number: [phone]     Requested Prescriptions:   Requested Prescriptions     Pending Prescriptions Disp Refills    Semaglutide-Weight Management 0.5 MG/0.5ML solution auto-injector 2 mL 3     Sig: Inject 0.5 mL under the skin into the appropriate area as directed Every 7 (Seven) Days. .        Pharmacy where request should be sent: [facility]     Last office visit with prescribing clinician: 9/29/2023   Last telemedicine visit with prescribing clinician: Visit date not found   Next office visit with prescribing clinician: 10/9/2024     PATIENT HAS DISCOUNT CARD FROM [facility] FOR 2.4 MG AND WOULD LIKE TO KNOW IF THIS WILL WORK FOR THE 0.50 MG. PLEASE ADVISE.    Does the patient have less than a 3 day supply:  [x] Yes  [] No    Would you like a call back once the refill request has been completed: [] Yes [x] No    If the office needs to give you a call back, can they leave a voicemail: [] Yes [x] No    Leonela Zuleta Rep   06/25/24 13:15 EDT

## 2024-10-02 ENCOUNTER — OFFICE VISIT (OUTPATIENT)
Dept: BEHAVIORAL HEALTH | Facility: CLINIC | Age: 34
End: 2024-10-02
Payer: COMMERCIAL

## 2024-10-02 VITALS
HEIGHT: 62 IN | HEART RATE: 85 BPM | BODY MASS INDEX: 54.56 KG/M2 | DIASTOLIC BLOOD PRESSURE: 82 MMHG | OXYGEN SATURATION: 99 % | SYSTOLIC BLOOD PRESSURE: 124 MMHG

## 2024-10-02 DIAGNOSIS — Z79.899 MEDICATION MANAGEMENT: ICD-10-CM

## 2024-10-02 DIAGNOSIS — F41.9 ANXIETY: ICD-10-CM

## 2024-10-02 DIAGNOSIS — F90.2 ADHD (ATTENTION DEFICIT HYPERACTIVITY DISORDER), COMBINED TYPE: Primary | ICD-10-CM

## 2024-10-02 DIAGNOSIS — F33.0 MAJOR DEPRESSIVE DISORDER, RECURRENT EPISODE, MILD: ICD-10-CM

## 2024-10-02 RX ORDER — DEXTROAMPHETAMINE SACCHARATE, AMPHETAMINE ASPARTATE, DEXTROAMPHETAMINE SULFATE AND AMPHETAMINE SULFATE 2.5; 2.5; 2.5; 2.5 MG/1; MG/1; MG/1; MG/1
10 TABLET ORAL 2 TIMES DAILY
Qty: 60 TABLET | Refills: 0 | Status: SHIPPED | OUTPATIENT
Start: 2024-10-02

## 2024-10-02 NOTE — PROGRESS NOTES
Follow Up Office Visit      Patient Name: Zi Laboy  : 1990   MRN: [id number]     Referring Provider: Nancy Melgar MD    Chief Complaint:      ICD-10-CM ICD-9-CM   1. ADHD (attention deficit hyperactivity disorder), combined type  F90.2 314.01   2. Major depressive disorder, recurrent episode, mild  F33.0 296.31   3. Anxiety  F41.9 300.00   4. Medication management  Z79.899 V58.69        History of Present Illness:   Zi Laboy is a 34 y.o. female who is here today for follow up and medication management    Subjective      Patient Reports:   History of Present Illness  The patient presents for evaluation of anxiety.    She reports that discontinuing Wellbutrin has not resulted in any adverse effects, and she is managing well without it. She does not experience irritability or agitation. She denies having suicidal thoughts, thoughts of harming others, or experiencing hallucinations.     Her sleep disturbances, characterized by difficulty falling asleep and maintaining sleep, are attributed to her anxiety rather than medication. She takes Adderall at 8:00 am and does not adhere to a specific bedtime routine. She often uses her phone until she feels sleepy. Despite attempts to limit screen time before bed, she finds it challenging to fall asleep. Her work schedule has been irregular for a significant period, which she believes has conditioned her body to stay awake. She typically sleeps between 5 to 7 hours per night and does not feel fatigued during the day.    She reports no changes in appetite and takes Adderall with food. She occasionally forgets to take her medication before work but usually remembers. If she needs to maintain focus throughout the day, she may take Adderall twice, but this is infrequent. She has previously stopped taking Adderall to assess its impact on her sleep but found no difference. She has not experienced any difficulties in obtaining it her Adderall.    Review of  Systems:   Review of Systems   Constitutional:  Negative for appetite change and unexpected weight change.   Eyes:  Negative for visual disturbance.   Respiratory:  Negative for chest tightness and shortness of breath.    Cardiovascular:  Negative for chest pain.   Musculoskeletal:  Negative for gait problem.   Skin:  Negative for rash and wound.   Neurological:  Negative for dizziness, tremors, seizures, weakness and light-headedness.   Psychiatric/Behavioral:  Positive for sleep disturbance. Negative for agitation, behavioral problems, confusion, dysphoric mood, hallucinations, self-injury and suicidal ideas. The patient is nervous/anxious. The patient is not hyperactive.      Sleep pattern: trouble going to sleep, no regular routine, 5-7 hours  Appetite: no changes     PHQ-9 Depression Screening  Little interest or pleasure in doing things? 3-->nearly every day   Feeling down, depressed, or hopeless? 1-->several days   Trouble falling or staying asleep, or sleeping too much? 3-->nearly every day   Feeling tired or having little energy? 3-->nearly every day   Poor appetite or overeating? 3-->nearly every day   Feeling bad about yourself - or that you are a failure or have let yourself or your family down? 1-->several days   Trouble concentrating on things, such as reading the newspaper or watching television? 0-->not at all   Moving or speaking so slowly that other people could have noticed? Or the opposite - being so fidgety or restless that you have been moving around a lot more than usual? 1-->several days   Thoughts that you would be better off dead, or of hurting yourself in some way? 0-->not at all   PHQ-9 Total Score 15   If you checked off any problems, how difficult have these problems made it for you to do your work, take care of things at home, or get along with other people? somewhat difficult     CORTNEY-7 Anxiety Screening  Over the last two weeks, how often have you been bothered by the following  problems?  Feeling nervous, anxious or on edge: Nearly every day  Not being able to stop or control worrying: Nearly every day  Worrying too much about different things: Nearly every day  Trouble Relaxing: Nearly every day  Being so restless that it is hard to sit still: Nearly every day  Becoming easily annoyed or irritable: Several days  Feeling afraid as if something awful might happen: Not at all  CORTNEY 7 Total Score: 16  If you checked any problems, how difficult have these problems made it for you to do your work, take care of things at home, or get along with other people: Somewhat difficult    RISK ASSESSMENT:  Patient denies any thoughts or intent of suicide today. Patient denies any impulsive behavior today.     Medications:     Current Outpatient Medications:     amphetamine-dextroamphetamine (Adderall) 10 MG tablet, Take 1 tablet by mouth 2 (Two) Times a Day., Disp: 60 tablet, Rfl: 0    metaxalone (SKELAXIN) 800 MG tablet, Take 1 tablet by mouth 3 (Three) Times a Day As Needed for Muscle Spasms., Disp: 15 tablet, Rfl: 0    naproxen (NAPROSYN) 500 MG tablet, Take 1 tablet by mouth 2 (Two) Times a Day With Meals., Disp: 14 tablet, Rfl: 0    Semaglutide-Weight Management (Wegovy) 0.5 MG/0.5ML solution auto-injector, Inject 0.5 mL under the skin into the appropriate area as directed 1 (One) Time Per Week., Disp: 2 mL, Rfl: 3    Semaglutide-Weight Management 0.5 MG/0.5ML solution auto-injector, Inject 0.5 mL under the skin into the appropriate area as directed Every 7 (Seven) Days. ., Disp: 2 mL, Rfl: 3    vitamin B-12 (CYANOCOBALAMIN) 1000 MCG tablet, Take 1 tablet by mouth Daily., Disp: , Rfl:     vitamin D (ERGOCALCIFEROL) 1.25 MG (65410 UT) capsule capsule, Take 1 capsule by mouth 1 (One) Time Per Week., Disp: 12 capsule, Rfl: 1    Medication Considerations:  KADEN reviewed and appropriate.      Allergies:   Not on File        Objective     Physical Exam:  Vital Signs:   Vitals:    10/02/24 1524   BP:  "124/82   Pulse: 85   SpO2: 99%   Height: 158.5 cm (62.4\")     Body mass index is 54.56 kg/m².     Mental Status Exam:   Hygiene:   good   Cooperation:  Cooperative  Eye Contact:  Good  Psychomotor Behavior:  Appropriate  Affect:  Appropriate  Mood: normal  Speech:  Normal  Thought Process:  Goal directed and Linear  Thought Content:  Normal  Suicidal:  None  Homicidal:  None  Hallucinations:  None  Delusion:  None  Memory:  Intact  Orientation:  Person, Place, Time, and Situation  Reliability:  good  Insight:  Good  Judgement:  Good  Impulse Control:  Good  Physical/Medical Issues:   see problem list      Assessment / Plan      Visit Diagnosis/Orders Placed This Visit:  Diagnoses and all orders for this visit:    1. ADHD (attention deficit hyperactivity disorder), combined type (Primary)  -     amphetamine-dextroamphetamine (Adderall) 10 MG tablet; Take 1 tablet by mouth 2 (Two) Times a Day.  Dispense: 60 tablet; Refill: 0  -     Compliance Drug Analysis, Ur - Urine, Clean Catch; Future    2. Major depressive disorder, recurrent episode, mild    3. Anxiety    4. Medication management  -     Compliance Drug Analysis, Ur - Urine, Clean Catch; Future             Functional Status: No impairment    Prognosis: Good with Ongoing Treatment     Impression/Formulation:  Patient appeared alert and oriented.  Patient is voluntarily requesting to continue outpatient psychiatric treatment at [facility].  Patient is receptive to assistance with maintaining a stable lifestyle.  Patient presents with history of     ICD-10-CM ICD-9-CM   1. ADHD (attention deficit hyperactivity disorder), combined type  F90.2 314.01   2. Major depressive disorder, recurrent episode, mild  F33.0 296.31   3. Anxiety  F41.9 300.00   4. Medication management  Z79.899 V58.69     Reviewed patient's previous provider notes. Reviewed most recent labs. Patient meets DSM V diagnostic criteria for diagnoses. Diagnoses may be updated as " more information becomes available.     Assessment & Plan  1. Anxiety.  The patient reports trouble falling and staying asleep, which she attributes partially to anxiety and lack of a bedtime routine. She has tried reducing phone usage before bed and engaging in calming activities like coloring. She is advised to continue these practices and establish a consistent bedtime routine. A three-month refill of her current medications will be provided. She is advised to contact the clinic if any issues arise.      2. ADHD Medication Management.  The patient is currently taking Adderall, typically at 8 AM, and occasionally a second dose if needed to maintain focus. She is advised to continue this regimen and ensure she takes the medication with food. A three-month refill will be provided. A UDS will be collected today. The controlled substance agreement is up to date and not due until February.    Follow-up  Return in 3 months for follow-up or sooner if needed.    Treatment Plan:   Collect UDS today  Continue adderall 10 mg twice a day    Patient will continue supportive psychotherapy efforts and medications as indicated. Clinic will obtain release of information for current treatment team for continuity of care as needed. Patient will contact this office, call 911 or present to the nearest emergency room should suicidal or homicidal ideations occur.  Discussed medication options and treatment plan of prescribed medication(s) as well as the risks, benefits, and potential side effects. Patient acknowledged and verbally consented to continue with current treatment plan and was educated on the importance of compliance with treatment and follow-up appointments.     Patient instructions:  Reviewed patient's KADEN. Discussed the risks including side effects, benefits and alternatives of stimulant medication use. We talked about the fact that these medications are schedule II controlled substances as well as the risks for  potential addiction. We also discussed how this medication will not be refilled early and the medication should not be shared or taken in any manner outside of as prescribed. We discussed the potential for decreased appetite, weight loss and cardiac effects as well. Also, advised patient to watch for heart racing, palpitations, chest pain, high blood pressure, increased irritability or agitation related to stimulant use.     Follow Up:   Return in about 3 months (around 1/2/2025) for Med Check.    Patient or patient representative verbalized consent for the use of Ambient Listening during the visit with  JARED Oliveros for chart documentation. 10/2/2024  15:44 EDT    JARED Oliveros, PMP-BC [facility]

## 2024-10-09 ENCOUNTER — OFFICE VISIT (OUTPATIENT)
Dept: INTERNAL MEDICINE | Facility: CLINIC | Age: 34
End: 2024-10-09
Payer: COMMERCIAL

## 2024-10-09 VITALS
TEMPERATURE: 96.9 F | BODY MASS INDEX: 53.92 KG/M2 | SYSTOLIC BLOOD PRESSURE: 122 MMHG | HEART RATE: 86 BPM | DIASTOLIC BLOOD PRESSURE: 78 MMHG | HEIGHT: 62 IN | WEIGHT: 293 LBS | OXYGEN SATURATION: 99 %

## 2024-10-09 DIAGNOSIS — R63.5 WEIGHT GAIN: ICD-10-CM

## 2024-10-09 DIAGNOSIS — E55.9 VITAMIN D DEFICIENCY: ICD-10-CM

## 2024-10-09 DIAGNOSIS — N39.0 URINARY TRACT INFECTION WITHOUT HEMATURIA, SITE UNSPECIFIED: ICD-10-CM

## 2024-10-09 DIAGNOSIS — Z00.00 ROUTINE GENERAL MEDICAL EXAMINATION AT A HEALTH CARE FACILITY: Primary | ICD-10-CM

## 2024-10-09 LAB
BILIRUB BLD-MCNC: NEGATIVE MG/DL
CLARITY, POC: CLEAR
COLOR UR: YELLOW
EXPIRATION DATE: ABNORMAL
GLUCOSE UR STRIP-MCNC: NEGATIVE MG/DL
KETONES UR QL: NEGATIVE
LEUKOCYTE EST, POC: ABNORMAL
Lab: ABNORMAL
NITRITE UR-MCNC: NEGATIVE MG/ML
PH UR: 6 [PH] (ref 5–8)
PROT UR STRIP-MCNC: NEGATIVE MG/DL
RBC # UR STRIP: NEGATIVE /UL
SP GR UR: 1.02 (ref 1–1.03)
UROBILINOGEN UR QL: NORMAL

## 2024-10-09 PROCEDURE — 87086 URINE CULTURE/COLONY COUNT: CPT | Performed by: INTERNAL MEDICINE

## 2024-10-09 PROCEDURE — 90715 TDAP VACCINE 7 YRS/> IM: CPT | Performed by: INTERNAL MEDICINE

## 2024-10-09 PROCEDURE — 99395 PREV VISIT EST AGE 18-39: CPT | Performed by: INTERNAL MEDICINE

## 2024-10-09 PROCEDURE — 81003 URINALYSIS AUTO W/O SCOPE: CPT | Performed by: INTERNAL MEDICINE

## 2024-10-09 PROCEDURE — 90471 IMMUNIZATION ADMIN: CPT | Performed by: INTERNAL MEDICINE

## 2024-10-09 RX ORDER — PNV NO.95/FERROUS FUM/FOLIC AC 28MG-0.8MG
1 TABLET ORAL DAILY
Qty: 30 TABLET | Refills: 11 | Status: SHIPPED | OUTPATIENT
Start: 2024-10-09

## 2024-10-09 RX ORDER — PHENTERMINE HYDROCHLORIDE 37.5 MG/1
TABLET ORAL
Qty: 30 TABLET | Refills: 0 | Status: SHIPPED | OUTPATIENT
Start: 2024-10-09

## 2024-10-09 NOTE — PROGRESS NOTES
Chief Complaint   Patient presents with    Annual Exam       History of Present Illness  HM, Adult Female:  The patient is being seen for a health maintenance evaluation. The last health maintenance visit was 1 year(s) ago.Gyn-Dr. Griffith.   Social History: She is single. Work status: full time  She smokes appx 1/2 ppd. She reports occ drinking alcohol. Denies any illicit drug use.  General Health: The patient's health is described as good. She has regular dental visits. She denies vision problems. She denies hearing loss. Immunizations status: up to date.   Lifestyle:. She consumes a diverse and healthy diet. She does have weight concerns. She exercises irregularly- stays active . She reports tobacco. She denies alcohol use. She denies drug use.   Reproductive health:. She reports normal menses, occ missed period.   Screening: Cancer screening reviewed and current.   Grandmother - paternal and half brother with breast cancer, she was advised to have Mamm starting the age of 35.  Metabolic screening reviewed and current.   Risk screening reviewed and current. . .     HPI  Has started Pilates, and monitoring her calories. Lost 6 lbs in this last month.  S/p fibroid surgery in Feb by Dr. Griffith.    Review of Systems   Constitutional:  Negative for chills and fatigue.   HENT:  Negative for congestion, ear pain and sore throat.    Eyes:  Negative for pain, redness and visual disturbance.   Respiratory:  Negative for cough and shortness of breath.    Cardiovascular:  Negative for chest pain, palpitations and leg swelling.   Gastrointestinal:  Negative for abdominal pain, diarrhea and nausea.   Endocrine: Negative for cold intolerance and heat intolerance.   Genitourinary:  Negative for flank pain and urgency.   Musculoskeletal:  Positive for back pain. Negative for arthralgias and gait problem.   Skin:  Negative for pallor and rash.   Neurological:  Negative for dizziness, weakness and headaches.  "  Psychiatric/Behavioral:  Negative for dysphoric mood and sleep disturbance. The patient is not nervous/anxious.        Patient Active Problem List   Diagnosis    Annual GYN exam    Anxiety    Family history of breast cancer - brother    Major depressive disorder, recurrent episode, mild    ADHD (attention deficit hyperactivity disorder), combined type       Social History     Socioeconomic History    Marital status: Single   Tobacco Use    Smoking status: Every Day     Current packs/day: 0.60     Average packs/day: 0.6 packs/day for 11.8 years (7.1 ttl pk-yrs)     Types: Cigarettes     Start date: 2013    Smokeless tobacco: Never    Tobacco comments:     When stressed i smoke more   Vaping Use    Vaping status: Never Used   Substance and Sexual Activity    Alcohol use: Yes     Comment: Socially    Drug use: Yes     Types: Marijuana    Sexual activity: Yes     Partners: Male       Current Outpatient Medications on File Prior to Visit   Medication Sig Dispense Refill    amphetamine-dextroamphetamine (Adderall) 10 MG tablet Take 1 tablet by mouth 2 (Two) Times a Day. 60 tablet 0     No current facility-administered medications on file prior to visit.       No Known Allergies    /78   Pulse 86   Temp 96.9 °F (36.1 °C)   Ht 158.5 cm (62.4\")   Wt (!) 139 kg (305 lb 9.6 oz)   LMP 09/22/2024 (Exact Date)   SpO2 99% Comment: ra  BMI 55.18 kg/m²            The following portions of the patient's history were reviewed and updated as appropriate: allergies, current medications, past family history, past medical history, past social history, past surgical history, and problem list.    Physical Exam  Constitutional:       General: She is not in acute distress.     Appearance: Normal appearance.   HENT:      Head: Normocephalic and atraumatic.      Right Ear: Tympanic membrane and external ear normal.      Left Ear: Tympanic membrane and external ear normal.      Nose: Nose normal.      Mouth/Throat:      Mouth: " Mucous membranes are moist.   Eyes:      General: No scleral icterus.  Neck:      Vascular: No carotid bruit.   Cardiovascular:      Rate and Rhythm: Normal rate and regular rhythm.      Pulses: Normal pulses.      Heart sounds: Normal heart sounds. No murmur heard.     No friction rub. No gallop.   Pulmonary:      Effort: Pulmonary effort is normal.      Breath sounds: Normal breath sounds. No rhonchi or rales.   Abdominal:      General: Bowel sounds are normal. There is no distension.      Palpations: Abdomen is soft.      Tenderness: There is no right CVA tenderness, left CVA tenderness, guarding or rebound.      Hernia: No hernia is present.   Musculoskeletal:         General: No tenderness. Normal range of motion.      Cervical back: Normal range of motion.      Right lower leg: No edema.      Left lower leg: No edema.   Lymphadenopathy:      Cervical: No cervical adenopathy.   Skin:     General: Skin is warm.      Findings: No rash.   Neurological:      General: No focal deficit present.      Mental Status: She is alert and oriented to person, place, and time. Mental status is at baseline.      Cranial Nerves: No cranial nerve deficit.      Sensory: No sensory deficit.      Coordination: Coordination normal.      Gait: Gait normal.      Deep Tendon Reflexes: Reflexes normal.      Comments: Balance wnl   Psychiatric:         Mood and Affect: Mood normal.         Behavior: Behavior normal.                 Results for orders placed or performed in visit on 10/09/24   Urine Culture - Urine, Urine, Clean Catch    Specimen: Urine, Clean Catch   Result Value Ref Range    Urine Culture No growth    POCT urinalysis dipstick, automated    Specimen: Urine   Result Value Ref Range    Color Yellow Yellow, Straw, Dark Yellow, Kelsey    Clarity, UA Clear Clear    Specific Gravity  1.020 1.005 - 1.030    pH, Urine 6.0 5.0 - 8.0    Leukocytes Small (1+) (A) Negative    Nitrite, UA Negative Negative    Protein, POC Negative  Negative mg/dL    Glucose, UA Negative Negative mg/dL    Ketones, UA Negative Negative    Urobilinogen, UA Normal Normal, 0.2 E.U./dL    Bilirubin Negative Negative    Blood, UA Negative Negative    Lot Number [id number]     Expiration Date 11/8/25        Diagnoses and all orders for this visit:    1. Routine general medical examination at a health care facility (Primary)  -     POCT urinalysis dipstick, automated  -     CBC (No Diff); Future  -     Comprehensive Metabolic Panel; Future  -     Lipid Panel; Future  -     Hemoglobin A1c; Future  -     TSH Rfx On Abnormal To Free T4; Future  -     Vitamin B12; Future  -     phentermine (ADIPEX-P) 37.5 MG tablet; Take 1/2 tab 30 min before breakfast and lunch  Dispense: 30 tablet; Refill: 0  -     Tdap Vaccine => 6yo IM (BOOSTRIX/ADACEL)  -     Prenatal Vit-Fe Fumarate-FA (Prenatal Vitamin and Mineral) 28-0.8 MG tablet; Take 1 each by mouth Daily.  Dispense: 30 tablet; Refill: 11    2. Urinary tract infection without hematuria, site unspecified  -     Urine Culture - Urine, Urine, Clean Catch; Future  -     Urine Culture - Urine, Urine, Clean Catch    3. Weight gain  -     phentermine (ADIPEX-P) 37.5 MG tablet; Take 1/2 tab 30 min before breakfast and lunch  Dispense: 30 tablet; Refill: 0    4. Vitamin D deficiency  -     Vitamin D,25-Hydroxy; Future          Health Maintenance   Topic Date Due    COVID-19 Vaccine (1 - 2023-24 season) 10/11/2024 (Originally 9/1/2024)    INFLUENZA VACCINE  03/31/2025 (Originally 8/1/2024)    Pneumococcal Vaccine 0-64 (1 of 2 - PCV) 10/09/2025 (Originally 8/25/1996)    PAP SMEAR  08/29/2025    ANNUAL PHYSICAL  10/09/2025    BMI FOLLOWUP  10/09/2025    TDAP/TD VACCINES (2 - Td or Tdap) 10/09/2034    HEPATITIS C SCREENING  Completed       Discussion/Summary  Impression: health maintenance visit. Currently, she eats an adequate diet and has an adequate exercise regimen.   Cervical cancer screening:Pap smear is current.   Gladis  Breast cancer screening: mammogram is due next year   Colorectal cancer screening: colonoscopy is current.  Osteoporosis screening: Bone mineral density test is not indicated.   CT low dose screen - not indicated  Screening lab work includes hemoglobin, glucose, lipid profile, thyroid function testing, 25-hydroxyvitamin D and urinalysis.   Immunizations are needed, immunizations will be given as outlined in the orders   Advice and education were given regarding cardiovascular risk reduction, healthy dietary habits, Seatbelt and helmet use and self skin examination.     Return in about 1 month (around 11/9/2024) for Recheck.      Electronically signed by:    Nancy Melgar MD

## 2024-10-11 ENCOUNTER — LAB (OUTPATIENT)
Dept: LAB | Facility: HOSPITAL | Age: 34
End: 2024-10-11
Payer: COMMERCIAL

## 2024-10-11 DIAGNOSIS — E55.9 VITAMIN D DEFICIENCY: ICD-10-CM

## 2024-10-11 DIAGNOSIS — Z00.00 ROUTINE GENERAL MEDICAL EXAMINATION AT A HEALTH CARE FACILITY: ICD-10-CM

## 2024-10-11 LAB
25(OH)D3 SERPL-MCNC: 17.7 NG/ML (ref 30–100)
ALBUMIN SERPL-MCNC: 4.1 G/DL (ref 3.5–5.2)
ALBUMIN/GLOB SERPL: 1.4 G/DL
ALP SERPL-CCNC: 100 U/L (ref 39–117)
ALT SERPL W P-5'-P-CCNC: 16 U/L (ref 1–33)
ANION GAP SERPL CALCULATED.3IONS-SCNC: 12.5 MMOL/L (ref 5–15)
AST SERPL-CCNC: 19 U/L (ref 1–32)
BACTERIA SPEC AEROBE CULT: NO GROWTH
BILIRUB SERPL-MCNC: 0.3 MG/DL (ref 0–1.2)
BUN SERPL-MCNC: 10 MG/DL (ref 6–20)
BUN/CREAT SERPL: 12.5 (ref 7–25)
CALCIUM SPEC-SCNC: 9.9 MG/DL (ref 8.6–10.5)
CHLORIDE SERPL-SCNC: 100 MMOL/L (ref 98–107)
CHOLEST SERPL-MCNC: 181 MG/DL (ref 0–200)
CO2 SERPL-SCNC: 24.5 MMOL/L (ref 22–29)
CREAT SERPL-MCNC: 0.8 MG/DL (ref 0.57–1)
DEPRECATED RDW RBC AUTO: 48.1 FL (ref 37–54)
EGFRCR SERPLBLD CKD-EPI 2021: 99.3 ML/MIN/1.73
ERYTHROCYTE [DISTWIDTH] IN BLOOD BY AUTOMATED COUNT: 13.1 % (ref 12.3–15.4)
GLOBULIN UR ELPH-MCNC: 3 GM/DL
GLUCOSE SERPL-MCNC: 84 MG/DL (ref 65–99)
HBA1C MFR BLD: 5.3 % (ref 4.8–5.6)
HCT VFR BLD AUTO: 43.2 % (ref 34–46.6)
HDLC SERPL-MCNC: 44 MG/DL (ref 40–60)
HGB BLD-MCNC: 13.6 G/DL (ref 12–15.9)
LDLC SERPL CALC-MCNC: 115 MG/DL (ref 0–100)
LDLC/HDLC SERPL: 2.57 {RATIO}
MCH RBC QN AUTO: 31.1 PG (ref 26.6–33)
MCHC RBC AUTO-ENTMCNC: 31.5 G/DL (ref 31.5–35.7)
MCV RBC AUTO: 98.9 FL (ref 79–97)
PLATELET # BLD AUTO: 231 10*3/MM3 (ref 140–450)
PMV BLD AUTO: 11.5 FL (ref 6–12)
POTASSIUM SERPL-SCNC: 3.9 MMOL/L (ref 3.5–5.2)
PROT SERPL-MCNC: 7.1 G/DL (ref 6–8.5)
RBC # BLD AUTO: 4.37 10*6/MM3 (ref 3.77–5.28)
SODIUM SERPL-SCNC: 137 MMOL/L (ref 136–145)
TRIGL SERPL-MCNC: 120 MG/DL (ref 0–150)
TSH SERPL DL<=0.05 MIU/L-ACNC: 3.76 UIU/ML (ref 0.27–4.2)
VIT B12 BLD-MCNC: 480 PG/ML (ref 211–946)
VLDLC SERPL-MCNC: 22 MG/DL (ref 5–40)
WBC NRBC COR # BLD AUTO: 5.03 10*3/MM3 (ref 3.4–10.8)

## 2024-10-11 PROCEDURE — 83036 HEMOGLOBIN GLYCOSYLATED A1C: CPT

## 2024-10-11 PROCEDURE — 82607 VITAMIN B-12: CPT

## 2024-10-11 PROCEDURE — 80061 LIPID PANEL: CPT

## 2024-10-11 PROCEDURE — 82306 VITAMIN D 25 HYDROXY: CPT

## 2024-10-11 PROCEDURE — 80050 GENERAL HEALTH PANEL: CPT

## 2024-10-29 ENCOUNTER — OFFICE VISIT (OUTPATIENT)
Dept: OBSTETRICS AND GYNECOLOGY | Facility: CLINIC | Age: 34
End: 2024-10-29
Payer: COMMERCIAL

## 2024-10-29 VITALS — SYSTOLIC BLOOD PRESSURE: 124 MMHG | BODY MASS INDEX: 55.79 KG/M2 | WEIGHT: 293 LBS | DIASTOLIC BLOOD PRESSURE: 80 MMHG

## 2024-10-29 DIAGNOSIS — N92.6 IRREGULAR MENSES: ICD-10-CM

## 2024-10-29 DIAGNOSIS — Z01.419 WELL WOMAN EXAM: Primary | ICD-10-CM

## 2024-10-29 DIAGNOSIS — Z80.3 FAMILY HISTORY OF BREAST CANCER: ICD-10-CM

## 2024-10-29 PROCEDURE — 99395 PREV VISIT EST AGE 18-39: CPT | Performed by: OBSTETRICS & GYNECOLOGY

## 2024-10-29 NOTE — PROGRESS NOTES
Subjective   Chief Complaint   Patient presents with    Gynecologic Exam     Zi Laboy is a 34 y.o. year old  presenting to be seen for her annual exam and follow-up on her menstrual cycle since resection of her type 0 submucosal fibroid    SEXUAL Hx:  She is currently sexually active.  In the past year there there has been NO new sexual partners.    Condoms are never used.  She would not like to be screened for STD's at today's exam.  Current birth control method: not using any form of contraception.  She is not trying to conceive but would be OK if she did get pregnant.  Is taking prenatal vitamins.  MENSTRUAL Hx:  Patient's last menstrual period was 2024 (exact date).  In the past 6 months her cycles have been regular, predictable and occur every 29-42 .  Most of the cycles have been between 30 and 36 days.  Her menstrual flow is typically normal.   Each month on average there are roughly 0 day(s) of very heavy flow.  She has been doing some ovulation predictor kit test and most months is showing evidence of ovulation.  Intermenstrual bleeding is absent.    Post-coital bleeding is absent.  Dysmenorrhea: is not affecting her activities of daily living  PMS: is not affecting her activities of daily living  Her cycles are not a source of concern for her that she wishes to discuss today.  HEALTH Hx:  She exercises regularly: no (and has no plans to become more active).  She wears her seat belt: no.  She has concerns about domestic violence: no.    The following portions of the patient's history were reviewed and updated as appropriate:problem list, current medications, allergies, past family history, past medical history, past social history, and past surgical history.    Social History    Tobacco Use      Smoking status: Every Day        Packs/day: 0.60        Years: 0.6 packs/day for 11.8 years (7.1 ttl pk-yrs)        Types: Cigarettes        Start date:       Smokeless tobacco: Never       Tobacco comments: When stressed i smoke more    Review of Systems  Constitutional POS: nothing reported    NEG: anorexia or night sweats   Genitourinary POS: frequency, nocturia, and it IS NOT effecting her daily living    NEG: dysuria or hematuria      Gastointestinal POS: nothing reported    NEG: bloating, change in bowel habits, melena, or reflux symptoms   Integument POS: nothing reported    NEG: moles that are changing in size, shape, color or rashes   Breast POS: nothing reported    NEG: persistent breast lump, skin dimpling, or nipple discharge        Objective   /80   Wt (!) 140 kg (309 lb)   LMP 09/22/2024 (Exact Date)   BMI 55.79 kg/m²     General:  well developed; well nourished  no acute distress  mentation appropriate   Skin:  No suspicious lesions seen   Thyroid: normal to inspection and palpation   Breasts:  Examined in supine position  Symmetric without masses or skin dimpling  Nipples normal without inversion, lesions or discharge  There are no palpable axillary nodes   Abdomen: soft, non-tender; no masses  no umbilical or inguinal hernias are present  no hepato-splenomegaly   Pelvis: Exam limited by  body habitus  Clinical staff was present for exam  External genitalia:  normal appearance of the external genitalia including Bartholin's and [place]'s glands.  :  urethral meatus normal;  Vaginal:  normal pink mucosa without prolapse or lesions.  Cervix:  normal appearance.  Uterus:  normal size, shape and consistency.  Adnexa:  non palpable bilaterally.  Rectal:  digital rectal exam not performed; anus visually normal appearing.        Assessment   Normal GYN exam limited by weight  Probable anovulatory cycles but cannot exclude normal ovulatory menses  Pre-conceptional - she currently is taking sufficient folic acid  FHx of breast cancer (brother)  Obesity     Plan   Pap was not done today.  I explained to Zi that the recommendations for Pap smear interval in a low risk patient has  lengthened to 3 years time.  I told Zi she still needs to be seen in our office yearly for a full physical including breast and pelvic exam.  She was encouraged to get yearly mammograms.  She should report any palpable breast lump(s) or skin changes regardless of mammographic findings.  I explained to Zi that notification regarding her mammogram results will come from the center performing the study.  Our office will not be routinely calling with mammogram results.  It is her responsibility to make sure that the results from the mammogram are communicated to her by the breast center.  If she has any questions about the results, she is welcome to call our office anytime.  The following tests were ordered today: Progesterone (d21).  It was explained to Zi that all lab test should be back within the one week after they are performed. She will be notified about the results, regardless of the findings. If she has not been contacted by the office within 2 weeks after the test has been performed, it is her responsibility to contact us to learn about her results.  I have counseled the patient on the importance of staying up to date with preventive vaccines as well as the risks and benefits of these vaccines.  Her vaccine record was reviewed and updated.  The importance of keeping all planned follow-up and taking all medications as prescribed was emphasized.  I strongly encouraged Zi to wear her seat belt, emphasizing that seat belt use, although not perfect has been shown to save lives.  Follow up for annual exam 1 year           This note was electronically signed.    Eliceo Griffith M.D.  October 29, 2024    Part of this note may be an electronic transcription/translation of spoken language to printed text using the Dragon Dictation System.

## 2024-11-19 ENCOUNTER — LAB (OUTPATIENT)
Dept: LAB | Facility: HOSPITAL | Age: 34
End: 2024-11-19
Payer: COMMERCIAL

## 2024-11-19 DIAGNOSIS — N92.6 IRREGULAR MENSES: ICD-10-CM

## 2024-11-19 PROCEDURE — 36415 COLL VENOUS BLD VENIPUNCTURE: CPT

## 2024-11-19 PROCEDURE — 84144 ASSAY OF PROGESTERONE: CPT

## 2024-11-20 LAB — PROGEST SERPL-MCNC: 6.79 NG/ML

## 2025-01-06 ENCOUNTER — TELEMEDICINE (OUTPATIENT)
Dept: BEHAVIORAL HEALTH | Facility: CLINIC | Age: 35
End: 2025-01-06
Payer: COMMERCIAL

## 2025-01-06 DIAGNOSIS — F90.2 ADHD (ATTENTION DEFICIT HYPERACTIVITY DISORDER), COMBINED TYPE: ICD-10-CM

## 2025-01-06 DIAGNOSIS — F41.9 ANXIETY: ICD-10-CM

## 2025-01-06 DIAGNOSIS — Z13.30 ENCOUNTER FOR SCREENING EXAMINATION FOR MENTAL HEALTH AND BEHAVIORAL DISORDERS: Primary | ICD-10-CM

## 2025-01-06 PROCEDURE — 99214 OFFICE O/P EST MOD 30 MIN: CPT | Performed by: REGISTERED NURSE

## 2025-01-06 PROCEDURE — 96127 BRIEF EMOTIONAL/BEHAV ASSMT: CPT | Performed by: REGISTERED NURSE

## 2025-01-06 RX ORDER — DEXTROAMPHETAMINE SACCHARATE, AMPHETAMINE ASPARTATE, DEXTROAMPHETAMINE SULFATE AND AMPHETAMINE SULFATE 2.5; 2.5; 2.5; 2.5 MG/1; MG/1; MG/1; MG/1
10 TABLET ORAL 2 TIMES DAILY
Qty: 60 TABLET | Refills: 0 | Status: SHIPPED | OUTPATIENT
Start: 2025-01-06

## 2025-01-06 NOTE — PROGRESS NOTES
Video Visit      Patient Name: Zi Laboy  : 1990   MRN: [id number]     Referring Provider: Nancy Melgar MD    Chief Complaint:      ICD-10-CM ICD-9-CM   1. Encounter for screening examination for mental health and behavioral disorders  Z13.30 V79.9   2. ADHD (attention deficit hyperactivity disorder), combined type  F90.2 314.01   3. Anxiety  F41.9 300.00        This provider is located at her home in [place], using a secure imageloopt Video Visit through [facility]. Patient is being seen remotely via telehealth video visit in [place], and stated they are in a secure environment for this session. The patient's condition being diagnosed/treated is appropriate for telemedicine. The provider identified herself as well as her credentials. The patient, and/or patients guardian, consent to be seen remotely, and when consent is given they understand that the consent allows for patient identifiable information to be sent to a third party as needed. They may refuse to be seen remotely at any time. The electronic data is encrypted and password protected, and the patient and/or guardian has been advised of the potential risks to privacy not withstanding such measures.    The patient has chosen to receive care today through a telehealth video visit. Do you consent to use a video/audio connection for your medical care today? Yes    Mode of Visit: Video  Location of patient: -HOME-  Location of provider: +HOME+  You have chosen to receive care through a telehealth visit.  The patient has signed the video visit consent form.  The visit included audio and video interaction. No technical issues occurred during this visit.      History of Present Illness:   Zi Laboy is a 34 y.o. female who is being seen by a video visit today for follow up and medication management.   History of Present Illness  The patient presents via virtual visit for evaluation of anxiety and depression.    She discontinued the use of Wellbutrin  prior to our last appointment and reports a stable mood with no significant stressors. She does not experience feelings of depression or hopelessness. She reports no changes in her appetite or eating habits.    She experiences anxiety and occasional worry, which she attributes to her stressful job. Her ability to relax is compromised, and she exhibits restlessness, particularly when required to sit still for extended periods. Adderall provides intermittent relief from these symptoms. She also reports occasional irritability and fear of impending doom. These symptoms moderately interfere with her daily activities and social interactions. She does not experience any adverse effects such as heart palpitations, increased irritability, or agitation while on Adderall. She is currently on Adderall and has not encountered any issues with obtaining it from the pharmacy. She has a limited supply of Adderall remaining, which she takes as needed, typically once or twice daily, depending on her requirements. The medication is usually reserved for weekdays, with exceptions made for weekends when she is engaged in household chores or social activities. She reports that the medication continues to be effective.    Her sleep pattern is inconsistent, averaging approximately 5 hours per night, but she does not report daytime fatigue. She acknowledges the need for a more consistent sleep routine, as she often spends several hours on her phone before sleeping. She does not engage in napping during the day.           Subjective      Review of Systems:   Review of Systems   Constitutional:  Negative for appetite change and unexpected weight change.   Eyes:  Negative for visual disturbance.   Respiratory:  Negative for chest tightness and shortness of breath.    Cardiovascular:  Negative for chest pain.   Musculoskeletal:  Negative for gait problem.   Skin:  Negative for rash and wound.   Neurological:  Negative for dizziness, tremors,  seizures, weakness and light-headedness.   Psychiatric/Behavioral:  Negative for agitation, behavioral problems, confusion, dysphoric mood, hallucinations, self-injury and suicidal ideas. The patient is not hyperactive.      Sleep pattern: 5 hours, not tired during the day  Appetite: no changes     PHQ-9 Depression Screening  Little interest or pleasure in doing things? Several days   Feeling down, depressed, or hopeless? Not at all   PHQ-2 Total Score 1   Trouble falling or staying asleep, or sleeping too much?     Feeling tired or having little energy?     Poor appetite or overeating?     Feeling bad about yourself - or that you are a failure or have let yourself or your family down?     Trouble concentrating on things, such as reading the newspaper or watching television?     Moving or speaking so slowly that other people could have noticed? Or the opposite - being so fidgety or restless that you have been moving around a lot more than usual?     Thoughts that you would be better off dead, or of hurting yourself in some way?     PHQ-9 Total Score     If you checked off any problems, how difficult have these problems made it for you to do your work, take care of things at home, or get along with other people?         CORTNEY-7 Anxiety Screening  Over the last two weeks, how often have you been bothered by the following problems?  Feeling nervous, anxious or on edge: Nearly every day  Not being able to stop or control worrying: Several days  Worrying too much about different things: Several days  Trouble Relaxing: Nearly every day  Being so restless that it is hard to sit still: Several days  Becoming easily annoyed or irritable: Several days  Feeling afraid as if something awful might happen: Not at all  CORTNEY 7 Total Score: 10  If you checked any problems, how difficult have these problems made it for you to do your work, take care of things at home, or get along with other people: Somewhat difficult    RISK  ASSESSMENT:  Patient denies any thoughts of suicide or intent today. Patient denies any suicidal or homicidal ideation today. Patient denies any high risk factors today.     Medications:     Current Outpatient Medications:     amphetamine-dextroamphetamine (Adderall) 10 MG tablet, Take 1 tablet by mouth 2 (Two) Times a Day., Disp: 60 tablet, Rfl: 0    phentermine (ADIPEX-P) 37.5 MG tablet, Take 1/2 tab 30 min before breakfast and lunch, Disp: 30 tablet, Rfl: 0    Prenatal Vit-Fe Fumarate-FA (Prenatal Vitamin and Mineral) 28-0.8 MG tablet, Take 1 each by mouth Daily., Disp: 30 tablet, Rfl: 11    Medication Considerations:  KADEN reviewed and appropriate.      Allergies:   No Known Allergies         Objective     Physical Exam:  Vital Signs: There were no vitals filed for this visit.  There is no height or weight on file to calculate BMI.   Due to telehealth appointment    Mental Status Exam:   Hygiene:   good  Cooperation:  Cooperative  Eye Contact:  Good  Psychomotor Behavior:  Appropriate  Affect:  Appropriate  Mood: normal  Speech:  Normal  Thought Process:  Goal directed and Linear  Thought Content:  Normal  Suicidal:  None  Homicidal:  None  Hallucinations:  None  Delusion:  None  Memory:  Intact  Orientation:  Person, Place, Time, and Situation  Reliability:  good  Insight:  Good  Judgement:  Good  Impulse Control:  Good  Physical/Medical Issues:   see problem list      Assessment / Plan      Visit Diagnosis/Orders Placed This Visit:  Diagnoses and all orders for this visit:    1. Encounter for screening examination for mental health and behavioral disorders (Primary)    2. ADHD (attention deficit hyperactivity disorder), combined type  -     amphetamine-dextroamphetamine (Adderall) 10 MG tablet; Take 1 tablet by mouth 2 (Two) Times a Day.  Dispense: 60 tablet; Refill: 0    3. Anxiety             Functional Status: No impairment    Prognosis: Good with Ongoing Treatment     Impression/Formulation:  Patient  appeared alert and oriented.  Patient is voluntarily requesting to continue outpatient psychiatric treatment at [facility].  Patient is receptive to assistance with maintaining a stable lifestyle.  Patient presents with history of     ICD-10-CM ICD-9-CM   1. Encounter for screening examination for mental health and behavioral disorders  Z13.30 V79.9   2. ADHD (attention deficit hyperactivity disorder), combined type  F90.2 314.01   3. Anxiety  F41.9 300.00   . Reviewed patient's previous provider notes. Reviewed most recent labs. Patient meets DSM V diagnostic criteria for diagnoses. Diagnoses may be updated as more information becomes available.     Assessment & Plan    She reports feeling nervous, anxious, or on edge all the time, with occasional inability to stop worrying and trouble relaxing. She finds her job stressful, which contributes to her anxiety. She sometimes feels restless and has trouble sitting still, but Adderall helps manage these symptoms. She does not experience heart racing or increased irritability on Adderall. She is advised to continue her current medication regimen.     She reports having little interest or pleasure in doing things for a few days over the past two weeks but does not feel down, depressed, or hopeless. She has discontinued Wellbutrin and feels less patel and stressed since gaining control over other aspects of her life. She is advised to continue her current medication regimen. If she encounters any difficulties in obtaining her medication from the pharmacy, she should inform us so we can facilitate the transfer of her prescription to another pharmacy. She is also advised to ensure that she has sufficient medication for at least 5 days prior to her next appointment.    She reports getting approximately 5 hours of sleep per day, which she does not find sufficient. She attributes her irregular sleep pattern to poor sleep hygiene, such as using her phone  before bed. She is advised to establish a consistent sleep routine and consider reading a book or engaging in other relaxing activities before bed to improve sleep quality.    Follow-up  The patient is scheduled for a follow-up visit on 04/07/2024 at 4:00 PM or sooner if needed.       Treatment Plan:   Continue adderall 10 mg twice a day      Patient will continue supportive psychotherapy efforts and medications as indicated. Clinic will obtain release of information for current treatment team for continuity of care as needed. Patient will contact this office, call 911 or present to the nearest emergency room should suicidal or homicidal ideations occur. Discussed medication options and treatment plan of prescribed medication(s) as well as the risks, benefits, and potential side effects. Patient ackowledged and verbally consented to continue with current treatment plan and was educated on the importance of compliance with treatment and follow-up appointments.     Patient instructions:  Reviewed patient's KADEN. Discussed the risks including side effects, benefits and alternatives of stimulant medication use. We talked about the fact that these medications are schedule II controlled substances as well as the risks for potential addiction. We also discussed how this medication will not be refilled early and the medication should not be shared or taken in any manner outside of as prescribed. We discussed the potential for decreased appetite, weight loss and cardiac effects as well. Also, advised patient to watch for heart racing, palpitations, chest pain, high blood pressure, increased irritability or agitation related to stimulant use.     Follow Up:   Return in about 3 months (around 4/6/2025) for Med Check.    Patient or patient representative verbalized consent for the use of Ambient Listening during the visit with  JARED Oliveros for chart documentation. 1/6/2025  15:59 EST        JARED Oliveros,  PMHNP-BC [facility]

## 2025-02-25 ENCOUNTER — TELEPHONE (OUTPATIENT)
Dept: OBSTETRICS AND GYNECOLOGY | Facility: CLINIC | Age: 35
End: 2025-02-25
Payer: COMMERCIAL

## 2025-02-25 NOTE — TELEPHONE ENCOUNTER
ITA    Pt called stating that her last period was on 1/18 and she is now 7 days late. Pt states that her period is irregular. She would like to know what her next steps are. Please advise. Thank you.

## 2025-02-25 NOTE — TELEPHONE ENCOUNTER
I have contacted the patient and advised her to do a pregnancy test tomorrow with first morning urine.  She will call back with results.

## 2025-04-07 ENCOUNTER — OFFICE VISIT (OUTPATIENT)
Dept: BEHAVIORAL HEALTH | Facility: CLINIC | Age: 35
End: 2025-04-07
Payer: COMMERCIAL

## 2025-04-07 VITALS
BODY MASS INDEX: 53.92 KG/M2 | OXYGEN SATURATION: 100 % | WEIGHT: 293 LBS | HEART RATE: 99 BPM | SYSTOLIC BLOOD PRESSURE: 128 MMHG | HEIGHT: 62 IN | DIASTOLIC BLOOD PRESSURE: 74 MMHG

## 2025-04-07 DIAGNOSIS — Z79.899 LONG-TERM USE OF HIGH-RISK MEDICATION: ICD-10-CM

## 2025-04-07 DIAGNOSIS — F33.0 MAJOR DEPRESSIVE DISORDER, RECURRENT EPISODE, MILD: Primary | ICD-10-CM

## 2025-04-07 DIAGNOSIS — F41.9 ANXIETY: ICD-10-CM

## 2025-04-07 DIAGNOSIS — F90.2 ADHD (ATTENTION DEFICIT HYPERACTIVITY DISORDER), COMBINED TYPE: ICD-10-CM

## 2025-04-07 PROCEDURE — 96127 BRIEF EMOTIONAL/BEHAV ASSMT: CPT | Performed by: REGISTERED NURSE

## 2025-04-07 PROCEDURE — 99214 OFFICE O/P EST MOD 30 MIN: CPT | Performed by: REGISTERED NURSE

## 2025-04-07 RX ORDER — DEXTROAMPHETAMINE SACCHARATE, AMPHETAMINE ASPARTATE, DEXTROAMPHETAMINE SULFATE AND AMPHETAMINE SULFATE 2.5; 2.5; 2.5; 2.5 MG/1; MG/1; MG/1; MG/1
10 TABLET ORAL 2 TIMES DAILY
Qty: 60 TABLET | Refills: 0 | Status: SHIPPED | OUTPATIENT
Start: 2025-04-07

## 2025-04-07 NOTE — PROGRESS NOTES
Follow Up Office Visit      Patient Name: Zi Laboy  : 1990   MRN: [id number]     Referring Provider: Nancy Melgar MD    Chief Complaint:      ICD-10-CM ICD-9-CM   1. Major depressive disorder, recurrent episode, mild  F33.0 296.31   2. Anxiety  F41.9 300.00   3. ADHD (attention deficit hyperactivity disorder), combined type  F90.2 314.01   4. Long-term use of high-risk medication  Z79.899 V58.69        History of Present Illness:   Zi Laboy is a 34 y.o. female who is here today for follow up and medication management    Subjective      Patient Reports:   History of Present Illness  The patient presents for evaluation of sleep issues and weight management.    She has been experiencing sleep disturbances, which she attributes to her irregular work schedule rather than her medication. Despite these disturbances, she reports feeling well-rested upon waking. She typically sleeps for 5 to 6 hours per night. She has been on a regimen of Adderall, taken once daily in the morning between 8 and 9 AM. She occasionally takes a second dose of Adderall if she anticipates a long day at work. She reports no difficulty in obtaining her medication. She also reports no cardiac symptoms such as palpitations or tachycardia, and no psychiatric symptoms such as irritability, agitation, suicidal ideation, or hallucinations. She does not take it on weekends.    She has made dietary modifications, including a calorie-restricted diet of approximately 1800 calories per day, and has increased her water intake. She has successfully lost weight, currently weighing 297 pounds, down from her previous weight of 309 pounds. She has also been engaging in regular exercise, including gym workouts and cycling. She is down almost 15 pounds since January.      Review of Systems:   Review of Systems   Constitutional:  Negative for appetite change and unexpected weight change.   Eyes:  Negative for visual disturbance.    Respiratory:  Negative for chest tightness and shortness of breath.    Cardiovascular:  Negative for chest pain.   Musculoskeletal:  Negative for gait problem.   Skin:  Negative for rash and wound.   Neurological:  Negative for dizziness, tremors, seizures, weakness and light-headedness.   Psychiatric/Behavioral:  Negative for agitation, behavioral problems, confusion, dysphoric mood, hallucinations, self-injury and suicidal ideas. The patient is not hyperactive.      Sleep pattern: varies, 5-6 hours per day, difficulty falling asleep  Appetite: calorie defecit     PHQ-9 Depression Screening  Little interest or pleasure in doing things? Not at all   Feeling down, depressed, or hopeless? Not at all   PHQ-2 Total Score 0   Trouble falling or staying asleep, or sleeping too much? Almost all   Feeling tired or having little energy? Not at all   Poor appetite or overeating? Not at all   Feeling bad about yourself - or that you are a failure or have let yourself or your family down? Not at all   Trouble concentrating on things, such as reading the newspaper or watching television? Over half   Moving or speaking so slowly that other people could have noticed? Or the opposite - being so fidgety or restless that you have been moving around a lot more than usual? Almost all   Thoughts that you would be better off dead, or of hurting yourself in some way? Not at all   PHQ-9 Total Score 8   If you checked off any problems, how difficult have these problems made it for you to do your work, take care of things at home, or get along with other people? Somewhat difficult       CORTNEY-7 Anxiety Screening  Over the last two weeks, how often have you been bothered by the following problems?  Feeling nervous, anxious or on edge: Not at all  Not being able to stop or control worrying: Not at all  Worrying too much about different things: Not at all  Trouble Relaxing: Not at all  Being so restless that it is hard to sit still: More than  "half the days  Becoming easily annoyed or irritable: Several days  Feeling afraid as if something awful might happen: Not at all  CORTNEY 7 Total Score: 3  If you checked any problems, how difficult have these problems made it for you to do your work, take care of things at home, or get along with other people: Somewhat difficult    RISK ASSESSMENT:  Patient denies any thoughts or intent of suicide today. Patient denies any impulsive behavior today.     Medications:     Current Outpatient Medications:     amphetamine-dextroamphetamine (Adderall) 10 MG tablet, Take 1 tablet by mouth 2 (Two) Times a Day., Disp: 60 tablet, Rfl: 0    phentermine (ADIPEX-P) 37.5 MG tablet, Take 1/2 tab 30 min before breakfast and lunch, Disp: 30 tablet, Rfl: 0    Prenatal Vit-Fe Fumarate-FA (Prenatal Vitamin and Mineral) 28-0.8 MG tablet, Take 1 each by mouth Daily., Disp: 30 tablet, Rfl: 11    Medication Considerations:  KADEN reviewed and appropriate.      Allergies:   No Known Allergies        Objective     Physical Exam:  Vital Signs:   Vitals:    04/07/25 1552 04/07/25 1554   BP:  128/74   Pulse:  99   SpO2:  100%   Weight: 135 kg (297 lb 6.4 oz)    Height: 158.5 cm (62.4\")      Body mass index is 53.7 kg/m².     Mental Status Exam:   Hygiene:   good   Cooperation:  Cooperative  Eye Contact:  Good  Psychomotor Behavior:  Appropriate  Affect:  Appropriate  Mood: normal  Speech:  Normal  Thought Process:  Goal directed and Linear  Thought Content:  Normal  Suicidal:  None  Homicidal:  None  Hallucinations:  None  Delusion:  None  Memory:  Intact  Orientation:  Person, Place, Time, and Situation  Reliability:  good  Insight:  Good  Judgement:  Good  Impulse Control:  Good  Physical/Medical Issues:   see problem list      Assessment / Plan      Visit Diagnosis/Orders Placed This Visit:  Diagnoses and all orders for this visit:    1. Major depressive disorder, recurrent episode, mild (Primary)    2. Anxiety    3. ADHD (attention deficit " hyperactivity disorder), combined type  -     amphetamine-dextroamphetamine (Adderall) 10 MG tablet; Take 1 tablet by mouth 2 (Two) Times a Day.  Dispense: 60 tablet; Refill: 0    4. Long-term use of high-risk medication  -     Compliance Drug Analysis, Ur - Urine, Clean Catch; Future  -     Compliance Drug Analysis, Ur - Urine, Clean Catch             Functional Status: No impairment    Prognosis: Good with Ongoing Treatment     Impression/Formulation:  Patient appeared alert and oriented.  Patient is voluntarily requesting to continue outpatient psychiatric treatment at [facility].  Patient is receptive to assistance with maintaining a stable lifestyle.  Patient presents with history of     ICD-10-CM ICD-9-CM   1. Major depressive disorder, recurrent episode, mild  F33.0 296.31   2. Anxiety  F41.9 300.00   3. ADHD (attention deficit hyperactivity disorder), combined type  F90.2 314.01   4. Long-term use of high-risk medication  Z79.899 V58.69   .    Reviewed patient's previous provider notes. Reviewed most recent labs. Patient meets DSM V diagnostic criteria for diagnoses. Diagnoses may be updated as more information becomes available.     Assessment & Plan    She reports difficulty falling asleep but feels well-rested once asleep. She attributes her sleep issues to her irregular work schedule and phone usage before bed. She is currently taking Adderall 10 mg once daily in the morning between 8 and 9 AM and occasionally takes a second dose if needed for long workdays. She reports that it works well for focus and concentration. A prescription for Adderall 10 mg twice daily will be provided, and she is advised to call when a refill is required.      Follow-up  The patient will follow up in 3 months or sooner if necessary.    Treatment Plan:   Continue adderall 10 mg twice a day  Collect UDS and controlled substance agreement today    Patient will continue supportive psychotherapy efforts and  medications as indicated. Clinic will obtain release of information for current treatment team for continuity of care as needed. Patient will contact this office, call 911 or present to the nearest emergency room should suicidal or homicidal ideations occur.  Discussed medication options and treatment plan of prescribed medication(s) as well as the risks, benefits, and potential side effects. Patient acknowledged and verbally consented to continue with current treatment plan and was educated on the importance of compliance with treatment and follow-up appointments.     Patient instructions:  Reviewed patient's KADEN. Discussed the risks including side effects, benefits and alternatives of stimulant medication use. We talked about the fact that these medications are schedule II controlled substances as well as the risks for potential addiction. We also discussed how this medication will not be refilled early and the medication should not be shared or taken in any manner outside of as prescribed. We discussed the potential for decreased appetite, weight loss and cardiac effects as well. Also, advised patient to watch for heart racing, palpitations, chest pain, high blood pressure, increased irritability or agitation related to stimulant use. This medication can be discontinued at the provider's discretion if misuse or addiction is suspected.     Follow Up:   Return in about 3 months (around 7/7/2025) for Med Check.    Patient or patient representative verbalized consent for the use of Ambient Listening during the visit with  JARED Oliveros for chart documentation. 4/7/2025  17:20 EDT    JARED Oliveros, PMHNP-BC [facility]

## 2025-04-11 LAB — DRUGS UR: NORMAL

## 2025-04-28 ENCOUNTER — HOSPITAL ENCOUNTER (OUTPATIENT)
Facility: HOSPITAL | Age: 35
Discharge: HOME | End: 2025-04-28
Admitting: OBSTETRICS & GYNECOLOGY
Payer: COMMERCIAL

## 2025-04-28 DIAGNOSIS — Z80.3 FAMILY HISTORY OF BREAST CANCER: ICD-10-CM

## 2025-04-28 PROCEDURE — 77063 BREAST TOMOSYNTHESIS BI: CPT

## 2025-04-28 PROCEDURE — 77067 SCR MAMMO BI INCL CAD: CPT

## 2025-04-29 PROCEDURE — 77063 BREAST TOMOSYNTHESIS BI: CPT | Performed by: RADIOLOGY

## 2025-04-29 PROCEDURE — 77067 SCR MAMMO BI INCL CAD: CPT | Performed by: RADIOLOGY

## 2025-05-21 DIAGNOSIS — F90.2 ADHD (ATTENTION DEFICIT HYPERACTIVITY DISORDER), COMBINED TYPE: ICD-10-CM

## 2025-05-21 NOTE — TELEPHONE ENCOUNTER
Called pt to inform her that Monique will be out of office during time of next refill, and want to ensure we have an up to date pharmacy so medication can be filled on time.    No answer. LVM to return call to office

## 2025-05-27 RX ORDER — DEXTROAMPHETAMINE SACCHARATE, AMPHETAMINE ASPARTATE, DEXTROAMPHETAMINE SULFATE AND AMPHETAMINE SULFATE 2.5; 2.5; 2.5; 2.5 MG/1; MG/1; MG/1; MG/1
10 TABLET ORAL 2 TIMES DAILY
Qty: 60 TABLET | Refills: 0 | Status: SHIPPED | OUTPATIENT
Start: 2025-05-27

## 2025-06-05 ENCOUNTER — OFFICE VISIT (OUTPATIENT)
Dept: OBSTETRICS AND GYNECOLOGY | Facility: CLINIC | Age: 35
End: 2025-06-05
Payer: COMMERCIAL

## 2025-06-05 ENCOUNTER — LAB (OUTPATIENT)
Dept: LAB | Facility: HOSPITAL | Age: 35
End: 2025-06-05
Payer: COMMERCIAL

## 2025-06-05 VITALS — BODY MASS INDEX: 51.46 KG/M2 | RESPIRATION RATE: 14 BRPM | WEIGHT: 285 LBS

## 2025-06-05 DIAGNOSIS — N92.6 IRREGULAR MENSES: Primary | ICD-10-CM

## 2025-06-05 DIAGNOSIS — N92.6 IRREGULAR MENSES: ICD-10-CM

## 2025-06-05 DIAGNOSIS — N91.4 SECONDARY OLIGOMENORRHEA: ICD-10-CM

## 2025-06-05 PROBLEM — Z13.30 ENCOUNTER FOR SCREENING EXAMINATION FOR MENTAL HEALTH AND BEHAVIORAL DISORDERS: Status: RESOLVED | Noted: 2025-01-06 | Resolved: 2025-06-05

## 2025-06-05 LAB — GLUCOSE SERPL-MCNC: 75 MG/DL (ref 65–99)

## 2025-06-05 PROCEDURE — 82397 CHEMILUMINESCENT ASSAY: CPT

## 2025-06-05 PROCEDURE — 83525 ASSAY OF INSULIN: CPT

## 2025-06-05 PROCEDURE — 36415 COLL VENOUS BLD VENIPUNCTURE: CPT

## 2025-06-05 PROCEDURE — 82947 ASSAY GLUCOSE BLOOD QUANT: CPT

## 2025-06-05 NOTE — PROGRESS NOTES
Subjective   Chief Complaint   Patient presents with    Consult       Zi MELCHOR Narda is a 34 y.o. year old .  No LMP recorded.  She comes today to talk about fertility.  She has questions whether she might have polycystic ovarian syndrome.  She has lost about 25 pounds.  She is also taken a prenatal vitamin along with and also tall.  She has had thyroid studies done not too long ago that were normal.  She did have a hemoglobin A1c in 2024 that was normal.  Cycles are unpredictable.  They are generally not very heavy.    The following portions of the patient's history were reviewed and updated as appropriate:current medications and allergies    Social History    Tobacco Use      Smoking status: Every Day        Packs/day: 0.60        Years: 0.6 packs/day for 12.4 years (7.5 ttl pk-yrs)        Types: Cigarettes        Start date:       Smokeless tobacco: Never      Tobacco comments: When stressed i smoke more         Objective   Resp 14   Wt 129 kg (285 lb)   BMI 51.46 kg/m²     Lab Review   See above    Imaging   No data reviewed        Assessment   Oligomenorrhea most consistent with anovulation  Preconceptional on folate     Plan   The following tests were ordered today: Fasting glucose, fasting insulin and anti-müllerian hormone.  It was explained to Zi that all lab test should be back within the one week after they are performed. She will be notified about the results, regardless of the findings. If she has not been contacted by the office within 2 weeks after the test has been performed, it is her responsibility to contact us to learn about her results.  Reviewed the polycystic ovarian syndrome is outdated nomenclature and more appropriately she has chronic anovulation related to weight.  Given her age I think we need to run some baseline tests to see what might be appropriate and if she is interested in talking about fertility medicine pending results of these tests we can discuss those  after review  The importance of keeping all planned follow-up and taking all medications as prescribed was emphasized.      No orders of the defined types were placed in this encounter.         This note was electronically signed.    Eliceo Griffith M.D.  June 5, 2025      Part of this note may be an electronic transcription/translation of spoken language to printed text using the Dragon Dictation System.

## 2025-06-06 ENCOUNTER — RESULTS FOLLOW-UP (OUTPATIENT)
Dept: OBSTETRICS AND GYNECOLOGY | Facility: CLINIC | Age: 35
End: 2025-06-06
Payer: COMMERCIAL

## 2025-06-06 LAB — INSULIN SERPL-ACNC: 4 UIU/ML (ref 2.6–24.9)

## 2025-06-09 LAB — MIS SERPL-MCNC: 3.08 NG/ML

## 2025-06-10 ENCOUNTER — TELEPHONE (OUTPATIENT)
Dept: OBSTETRICS AND GYNECOLOGY | Facility: CLINIC | Age: 35
End: 2025-06-10
Payer: COMMERCIAL

## 2025-06-10 NOTE — TELEPHONE ENCOUNTER
Pt called back and has been advised that we will call and advise of result once her provider has commented on it. She demonstrates understanding and appreciation

## 2025-06-10 NOTE — TELEPHONE ENCOUNTER
Voicemail is not set up, unable to leave message.     Antimullerian Hormone (AMH) (06/05/2025 10:04)     Sending to Dr. Griffith as this is back now. Will inform pt that she will be notified of this result once commented on by her provider.

## 2025-06-11 NOTE — TELEPHONE ENCOUNTER
Her anti-müllerian hormone was normal.  This means that at 34 years of age her ovarian reserve is still acceptable.  If she wants to talk in further detail about medications we can give her to help ovulate with her polycystic ovarian syndrome we should see on the office to review the protocol, plans and expectations with these medications

## 2025-06-12 ENCOUNTER — TELEPHONE (OUTPATIENT)
Dept: OBSTETRICS AND GYNECOLOGY | Facility: CLINIC | Age: 35
End: 2025-06-12
Payer: COMMERCIAL

## 2025-06-12 NOTE — TELEPHONE ENCOUNTER
Pt informed and stated understanding.      Pt would like to make an appointment and will call to the front office for scheduling

## 2025-06-17 ENCOUNTER — OFFICE VISIT (OUTPATIENT)
Dept: OBSTETRICS AND GYNECOLOGY | Facility: CLINIC | Age: 35
End: 2025-06-17
Payer: COMMERCIAL

## 2025-06-17 VITALS — RESPIRATION RATE: 14 BRPM | BODY MASS INDEX: 51.46 KG/M2 | WEIGHT: 285 LBS

## 2025-06-17 DIAGNOSIS — N92.6 IRREGULAR MENSES: Primary | ICD-10-CM

## 2025-06-17 DIAGNOSIS — N97.9 FEMALE INFERTILITY: ICD-10-CM

## 2025-06-17 PROCEDURE — 99213 OFFICE O/P EST LOW 20 MIN: CPT | Performed by: OBSTETRICS & GYNECOLOGY

## 2025-06-17 RX ORDER — LETROZOLE 2.5 MG/1
TABLET, FILM COATED ORAL
Qty: 5 TABLET | Refills: 0 | Status: SHIPPED | OUTPATIENT
Start: 2025-06-17

## 2025-06-17 NOTE — PROGRESS NOTES
Subjective   Chief Complaint   Patient presents with    Follow-up       Zi RUIZ Laboy is a 34 y.o. year old .  No LMP recorded.  She comes today to talk about fertility issues.  She has had testing done and her AMH is acceptable and there is not evidence of hyperinsulinism    The following portions of the patient's history were reviewed and updated as appropriate:current medications and allergies    Social History    Tobacco Use      Smoking status: Every Day        Packs/day: 0.60        Years: 0.6 packs/day for 12.5 years (7.5 ttl pk-yrs)        Types: Cigarettes        Start date:       Smokeless tobacco: Never      Tobacco comments: When stressed i smoke more         Objective   Resp 14   Wt 129 kg (285 lb)   BMI 51.46 kg/m²     Lab Review   No data reviewed    Imaging   No data reviewed        Assessment   Primary infertility with changes consistent with anovulation without hyperinsulinism     Plan   Reviewed management options to include clomiphene and Femara with higher ovulation rates and lower twin weights with Femara  Reviewed plan for day 21 progesterone to assess ovulation and need for progestin withdrawal if no cycle by day 35  The following tests were ordered today: Progesterone (d21).  It was explained to Marckaustinnannette that all lab test should be back within the one week after they are performed. She will be notified about the results, regardless of the findings. If she has not been contacted by the office within 2 weeks after the test has been performed, it is her responsibility to contact us to learn about her results.  Subsequent month dosing of Femara pending response to current month  Treatment limited to 4 months of ovulatory cycles and if not pregnant by that time, consider completing workup further  The importance of keeping all planned follow-up and taking all medications as prescribed was emphasized.  Follow up PRN     New Medications Ordered This Visit   Medications    letrozole  (Femara) 2.5 MG tablet     Si pill by mouth daily day 3 through 7     Dispense:  5 tablet     Refill:  0          This note was electronically signed.    Eliceo Griffith M.D.  2025        Part of this note may be an electronic transcription/translation of spoken language to printed text using the Dragon Dictation System.

## 2025-07-07 ENCOUNTER — OFFICE VISIT (OUTPATIENT)
Dept: BEHAVIORAL HEALTH | Facility: CLINIC | Age: 35
End: 2025-07-07
Payer: COMMERCIAL

## 2025-07-07 ENCOUNTER — LAB (OUTPATIENT)
Dept: LAB | Facility: HOSPITAL | Age: 35
End: 2025-07-07
Payer: COMMERCIAL

## 2025-07-07 VITALS
OXYGEN SATURATION: 98 % | DIASTOLIC BLOOD PRESSURE: 72 MMHG | BODY MASS INDEX: 52.26 KG/M2 | SYSTOLIC BLOOD PRESSURE: 126 MMHG | HEIGHT: 62 IN | WEIGHT: 284 LBS | HEART RATE: 118 BPM

## 2025-07-07 DIAGNOSIS — F90.2 ADHD (ATTENTION DEFICIT HYPERACTIVITY DISORDER), COMBINED TYPE: Primary | ICD-10-CM

## 2025-07-07 DIAGNOSIS — F33.0 MAJOR DEPRESSIVE DISORDER, RECURRENT EPISODE, MILD: ICD-10-CM

## 2025-07-07 DIAGNOSIS — N92.6 IRREGULAR MENSES: ICD-10-CM

## 2025-07-07 DIAGNOSIS — F41.9 ANXIETY: ICD-10-CM

## 2025-07-07 PROCEDURE — 84144 ASSAY OF PROGESTERONE: CPT

## 2025-07-07 PROCEDURE — 36415 COLL VENOUS BLD VENIPUNCTURE: CPT

## 2025-07-07 RX ORDER — DEXTROAMPHETAMINE SACCHARATE, AMPHETAMINE ASPARTATE, DEXTROAMPHETAMINE SULFATE AND AMPHETAMINE SULFATE 2.5; 2.5; 2.5; 2.5 MG/1; MG/1; MG/1; MG/1
10 TABLET ORAL 2 TIMES DAILY
Qty: 60 TABLET | Refills: 0 | Status: SHIPPED | OUTPATIENT
Start: 2025-07-07

## 2025-07-07 NOTE — PROGRESS NOTES
Follow Up Office Visit      Patient Name: Zi Laboy  : 1990   MRN: [id number]     Referring Provider: Nancy Melgar MD    Chief Complaint:      ICD-10-CM ICD-9-CM   1. ADHD (attention deficit hyperactivity disorder), combined type  F90.2 314.01   2. Major depressive disorder, recurrent episode, mild  F33.0 296.31   3. Anxiety  F41.9 300.00        History of Present Illness:   Zi Laboy is a 34 y.o. female who is here today for follow up and medication management    Subjective      Patient Reports:   History of Present Illness  The patient presents for evaluation and medication management.    She has been managing her weight through calorie control and hydration, resulting in a decrease from 309 pounds in 10/2024 to 284 pounds currently. She joined a fitness center in 2025 but canceled her membership in 2025 due to an unexpected annual fee.     She continues to take Adderall 10 mg twice daily, excluding weekends or non-working days. She reports no significant changes when she skips the medication but notes increased productivity when she takes it. She does not experience any heart racing, irritability, or agitation beyond her usual levels. She has a few pills left and requests a refill. She experiences dry mouth as a side effect of the medication but finds it beneficial as it suppresses her appetite.    Her sleep has improved with the nightly use of magnesium glycinate, which she takes around 8:30 or 9:00 PM to ensure she is in bed by 11:00 PM. This routine allows her to achieve approximately 7.5 to 8.5 hours of sleep per night.      Review of Systems:   Review of Systems   Constitutional:  Negative for appetite change and unexpected weight change.   Eyes:  Negative for visual disturbance.   Respiratory:  Negative for chest tightness and shortness of breath.    Cardiovascular:  Negative for chest pain.   Musculoskeletal:  Negative for gait problem.   Skin:  Negative for rash and  wound.   Neurological:  Negative for dizziness, tremors, seizures, weakness and light-headedness.   Psychiatric/Behavioral:  Negative for agitation, behavioral problems, confusion, dysphoric mood, hallucinations, self-injury and suicidal ideas. The patient is not hyperactive.      Sleep pattern: taking magnesium, 8.5 hours  Appetite: working on appetite     PHQ-9 Depression Screening  Little interest or pleasure in doing things? Not at all   Feeling down, depressed, or hopeless? Not at all   PHQ-2 Total Score 0   Trouble falling or staying asleep, or sleeping too much? Several days   Feeling tired or having little energy? Not at all   Poor appetite or overeating? Not at all   Feeling bad about yourself - or that you are a failure or have let yourself or your family down? Not at all   Trouble concentrating on things, such as reading the newspaper or watching television? Not at all   Moving or speaking so slowly that other people could have noticed? Or the opposite - being so fidgety or restless that you have been moving around a lot more than usual? Not at all   Thoughts that you would be better off dead, or of hurting yourself in some way? Not at all   PHQ-9 Total Score 1   If you checked off any problems, how difficult have these problems made it for you to do your work, take care of things at home, or get along with other people? Not difficult at all       CORTNEY-7 Anxiety Screening  Over the last two weeks, how often have you been bothered by the following problems?  Feeling nervous, anxious or on edge: More than half the days  Not being able to stop or control worrying: Several days  Worrying too much about different things: Several days  Trouble Relaxing: Several days  Being so restless that it is hard to sit still: More than half the days  Becoming easily annoyed or irritable: More than half the days  Feeling afraid as if something awful might happen: Not at all  CORTNEY 7 Total Score: 9  If you checked any problems,  "how difficult have these problems made it for you to do your work, take care of things at home, or get along with other people: Somewhat difficult    RISK ASSESSMENT:  Patient denies any thoughts or intent of suicide today. Patient denies any impulsive behavior today.     Medications:     Current Outpatient Medications:     amphetamine-dextroamphetamine (Adderall) 10 MG tablet, Take 1 tablet by mouth 2 (Two) Times a Day., Disp: 60 tablet, Rfl: 0    letrozole (Femara) 2.5 MG tablet, 1 pill by mouth daily day 3 through 7, Disp: 5 tablet, Rfl: 0    Prenatal Vit-Fe Fumarate-FA (Prenatal Vitamin and Mineral) 28-0.8 MG tablet, Take 1 each by mouth Daily., Disp: 30 tablet, Rfl: 11    Medication Considerations:  KADEN reviewed and appropriate.      Allergies:   No Known Allergies      Objective     Physical Exam:  Vital Signs:   Vitals:    07/07/25 1603 07/07/25 1606   BP:  126/72   Pulse:  118   SpO2:  98%   Weight: 129 kg (284 lb)    Height: 158.5 cm (62.4\")      Body mass index is 51.28 kg/m².     Mental Status Exam:   Hygiene:   good   Cooperation:  Cooperative  Eye Contact:  Good  Psychomotor Behavior:  Appropriate  Affect:  Appropriate  Mood: normal  Speech:  Normal  Thought Process:  Goal directed and Linear  Thought Content:  Normal  Suicidal:  None  Homicidal:  None  Hallucinations:  None  Delusion:  None  Memory:  Intact  Orientation:  Person, Place, Time, and Situation  Reliability:  good  Insight:  Good  Judgement:  Good  Impulse Control:  Good  Physical/Medical Issues:  see problem list     Assessment / Plan      Visit Diagnosis/Orders Placed This Visit:  Diagnoses and all orders for this visit:    1. ADHD (attention deficit hyperactivity disorder), combined type (Primary)  -     amphetamine-dextroamphetamine (Adderall) 10 MG tablet; Take 1 tablet by mouth 2 (Two) Times a Day.  Dispense: 60 tablet; Refill: 0    2. Major depressive disorder, recurrent episode, mild    3. Anxiety             Functional Status: " No impairment    Prognosis: Good with Ongoing Treatment     Impression/Formulation:  Patient appeared alert and oriented.  Patient is voluntarily requesting to continue outpatient psychiatric treatment at [facility].  Patient is receptive to assistance with maintaining a stable lifestyle.  Patient presents with history of     ICD-10-CM ICD-9-CM   1. ADHD (attention deficit hyperactivity disorder), combined type  F90.2 314.01   2. Major depressive disorder, recurrent episode, mild  F33.0 296.31   3. Anxiety  F41.9 300.00   .    Reviewed patient's previous provider notes. Reviewed most recent labs. Patient meets DSM V diagnostic criteria for diagnoses. Diagnoses may be updated as more information becomes available.     Assessment & Plan  The patient discussed her weight loss journey, including dietary changes and exercise routines. The patient also talked about her medication regimen, specifically Adderall, and its impact on her daily functioning. Sleep issues were addressed, with the patient noting improvements after starting magnesium glycinate.    The patient has made significant progress in weight management, reducing her weight from 309 pounds in October to 284 pounds currently. She reports effective management of her ADHD symptoms with Adderall 10 mg twice a day on weekdays, experiencing only dry mouth as a notable side effect. Sleep has improved with the use of magnesium glycinate, allowing her to achieve 7.5 to 8.5 hours of sleep per night. Overall, the patient appears to be responding well to treatment and is motivated to continue her current regimen.      Plan:    - Continue taking Adderall 10 mg twice a day on weekdays.  - Continue taking magnesium glycinate nightly for sleep.  - Monitor for any new or worsening side effects.    Follow-up:  - Schedule a follow-up visit in 3 months, or earlier if necessary.      Patient will continue supportive psychotherapy efforts and medications as  indicated. Clinic will obtain release of information for current treatment team for continuity of care as needed. Patient will contact this office, call [phone] or present to the nearest emergency room should suicidal or homicidal ideations occur.  Discussed medication options and treatment plan of prescribed medication(s) as well as the risks, benefits, and potential side effects. Patient acknowledged and verbally consented to continue with current treatment plan and was educated on the importance of compliance with treatment and follow-up appointments.     Patient instructions:  Reviewed patient's KADEN. Discussed the risks including side effects, benefits and alternatives of stimulant medication use. We talked about the fact that these medications are schedule II controlled substances as well as the risks for potential addiction. We also discussed how this medication will not be refilled early and the medication should not be shared or taken in any manner outside of as prescribed. We discussed the potential for decreased appetite, weight loss and cardiac effects as well. Also, advised patient to watch for heart racing, palpitations, chest pain, high blood pressure, increased irritability or agitation related to stimulant use. This medication can be discontinued at the provider's discretion if misuse or addiction is suspected.       Patient or patient representative verbalized consent for the use of Ambient Listening during the visit with  JARED Oliveros for chart documentation. 7/8/2025  15:48 EDT    JARED Oliveros, [facility]

## 2025-07-08 LAB — PROGEST SERPL-MCNC: 14.1 NG/ML

## 2025-07-17 ENCOUNTER — TELEPHONE (OUTPATIENT)
Dept: OBSTETRICS AND GYNECOLOGY | Facility: CLINIC | Age: 35
End: 2025-07-17
Payer: COMMERCIAL

## 2025-07-17 NOTE — TELEPHONE ENCOUNTER
Does she think she ovulated while taking letrozole this past month?  On what cycle day did her menstrual cycle start this month?   [facility]

## 2025-07-18 RX ORDER — LETROZOLE 2.5 MG/1
TABLET, FILM COATED ORAL
Qty: 5 TABLET | Refills: 0 | Status: SHIPPED | OUTPATIENT
Start: 2025-07-18

## 2025-07-18 NOTE — TELEPHONE ENCOUNTER
Refill sent to her [facility] pharmacy.    New Medications Ordered This Visit   Medications    letrozole (Femara) 2.5 MG tablet     Si pill by mouth daily day 3 through 7     Dispense:  5 tablet     Refill:  0

## 2025-08-04 ENCOUNTER — TELEPHONE (OUTPATIENT)
Dept: INTERNAL MEDICINE | Facility: CLINIC | Age: 35
End: 2025-08-04
Payer: COMMERCIAL

## 2025-08-05 ENCOUNTER — OFFICE VISIT (OUTPATIENT)
Dept: INTERNAL MEDICINE | Facility: CLINIC | Age: 35
End: 2025-08-05
Payer: COMMERCIAL

## 2025-08-05 VITALS
TEMPERATURE: 97.9 F | HEIGHT: 62 IN | HEART RATE: 105 BPM | SYSTOLIC BLOOD PRESSURE: 142 MMHG | OXYGEN SATURATION: 100 % | DIASTOLIC BLOOD PRESSURE: 86 MMHG | WEIGHT: 276 LBS | BODY MASS INDEX: 50.79 KG/M2

## 2025-08-05 DIAGNOSIS — L02.214 ABSCESS OF GROIN, RIGHT: Primary | ICD-10-CM

## 2025-08-05 DIAGNOSIS — J30.89 SEASONAL ALLERGIC RHINITIS DUE TO OTHER ALLERGIC TRIGGER: ICD-10-CM

## 2025-08-05 PROCEDURE — 87205 SMEAR GRAM STAIN: CPT | Performed by: INTERNAL MEDICINE

## 2025-08-05 PROCEDURE — 87070 CULTURE OTHR SPECIMN AEROBIC: CPT | Performed by: INTERNAL MEDICINE

## 2025-08-05 PROCEDURE — 99213 OFFICE O/P EST LOW 20 MIN: CPT | Performed by: INTERNAL MEDICINE

## 2025-08-05 PROCEDURE — 87147 CULTURE TYPE IMMUNOLOGIC: CPT | Performed by: INTERNAL MEDICINE

## 2025-08-05 PROCEDURE — 87186 SC STD MICRODIL/AGAR DIL: CPT | Performed by: INTERNAL MEDICINE

## 2025-08-05 RX ORDER — LEVOCETIRIZINE DIHYDROCHLORIDE 5 MG/1
5 TABLET, FILM COATED ORAL EVERY EVENING
Qty: 30 TABLET | Refills: 3 | Status: SHIPPED | OUTPATIENT
Start: 2025-08-05

## 2025-08-05 RX ORDER — MUPIROCIN 2 %
1 OINTMENT (GRAM) TOPICAL 2 TIMES DAILY
Qty: 22 G | Refills: 0 | Status: SHIPPED | OUTPATIENT
Start: 2025-08-05

## 2025-08-05 RX ORDER — CEPHALEXIN 500 MG/1
500 CAPSULE ORAL 3 TIMES DAILY
Qty: 30 CAPSULE | Refills: 0 | Status: SHIPPED | OUTPATIENT
Start: 2025-08-05 | End: 2025-08-15

## 2025-08-05 RX ORDER — FLUCONAZOLE 150 MG/1
150 TABLET ORAL
Qty: 2 TABLET | Refills: 0 | Status: SHIPPED | OUTPATIENT
Start: 2025-08-05

## 2025-08-08 ENCOUNTER — TELEPHONE (OUTPATIENT)
Dept: INTERNAL MEDICINE | Facility: CLINIC | Age: 35
End: 2025-08-08
Payer: COMMERCIAL

## 2025-08-09 ENCOUNTER — RESULTS FOLLOW-UP (OUTPATIENT)
Dept: INTERNAL MEDICINE | Facility: CLINIC | Age: 35
End: 2025-08-09
Payer: COMMERCIAL

## 2025-08-09 DIAGNOSIS — L02.214 ABSCESS OF GROIN, RIGHT: Primary | ICD-10-CM

## 2025-08-09 LAB
BACTERIA SPEC AEROBE CULT: ABNORMAL
BACTERIA SPEC AEROBE CULT: ABNORMAL
GRAM STN SPEC: ABNORMAL
GRAM STN SPEC: ABNORMAL

## 2025-08-09 RX ORDER — DOXYCYCLINE 100 MG/1
100 CAPSULE ORAL 2 TIMES DAILY
Qty: 14 CAPSULE | Refills: 0 | Status: SHIPPED | OUTPATIENT
Start: 2025-08-09

## 2025-08-13 ENCOUNTER — TELEPHONE (OUTPATIENT)
Dept: OBSTETRICS AND GYNECOLOGY | Facility: CLINIC | Age: 35
End: 2025-08-13
Payer: COMMERCIAL

## 2025-08-14 RX ORDER — LETROZOLE 2.5 MG/1
TABLET, FILM COATED ORAL
Qty: 5 TABLET | Refills: 0 | Status: SHIPPED | OUTPATIENT
Start: 2025-08-14 | End: 2025-08-15 | Stop reason: SDUPTHER

## 2025-08-15 DIAGNOSIS — Z31.41 FERTILITY TESTING: Primary | ICD-10-CM

## 2025-08-15 RX ORDER — LETROZOLE 2.5 MG/1
TABLET, FILM COATED ORAL
Qty: 5 TABLET | Refills: 0 | Status: SHIPPED | OUTPATIENT
Start: 2025-08-15